# Patient Record
Sex: FEMALE | Race: WHITE | Employment: OTHER | URBAN - METROPOLITAN AREA
[De-identification: names, ages, dates, MRNs, and addresses within clinical notes are randomized per-mention and may not be internally consistent; named-entity substitution may affect disease eponyms.]

---

## 2017-11-09 PROBLEM — I87.2 CHRONIC VENOUS INSUFFICIENCY: Status: ACTIVE | Noted: 2017-11-09

## 2017-11-09 PROBLEM — Z87.891 HISTORY OF TOBACCO USE: Status: ACTIVE | Noted: 2017-11-09

## 2017-11-09 PROBLEM — L97.511 SKIN ULCER OF RIGHT GREAT TOE, LIMITED TO BREAKDOWN OF SKIN (HCC): Status: ACTIVE | Noted: 2017-11-09

## 2017-11-09 PROBLEM — I73.9 PVD (PERIPHERAL VASCULAR DISEASE) WITH CLAUDICATION (HCC): Status: ACTIVE | Noted: 2017-11-09

## 2017-11-09 PROBLEM — I87.2 CHRONIC VENOUS INSUFFICIENCY: Status: RESOLVED | Noted: 2017-11-09 | Resolved: 2017-11-09

## 2021-12-10 ENCOUNTER — APPOINTMENT (OUTPATIENT)
Dept: GENERAL RADIOLOGY | Age: 71
End: 2021-12-10
Payer: MEDICARE

## 2021-12-10 ENCOUNTER — APPOINTMENT (OUTPATIENT)
Dept: CT IMAGING | Age: 71
End: 2021-12-10
Payer: MEDICARE

## 2021-12-10 ENCOUNTER — HOSPITAL ENCOUNTER (EMERGENCY)
Age: 71
Discharge: HOME | End: 2021-12-10
Payer: MEDICARE

## 2021-12-10 VITALS
TEMPERATURE: 97.3 F | SYSTOLIC BLOOD PRESSURE: 143 MMHG | RESPIRATION RATE: 20 BRPM | HEART RATE: 84 BPM | OXYGEN SATURATION: 97 % | DIASTOLIC BLOOD PRESSURE: 72 MMHG

## 2021-12-10 DIAGNOSIS — M25.462 EFFUSION OF BOTH KNEE JOINTS: ICD-10-CM

## 2021-12-10 DIAGNOSIS — M25.461 EFFUSION OF BOTH KNEE JOINTS: ICD-10-CM
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
## 2021-12-10 DIAGNOSIS — S22.030A COMPRESSION FRACTURE OF T3 VERTEBRA, INITIAL ENCOUNTER (HCC): ICD-10-CM

## 2021-12-10 DIAGNOSIS — G93.89 MASS OF BRAIN: ICD-10-CM

## 2021-12-10 PROCEDURE — 72072 X-RAY EXAM THORAC SPINE 3VWS: CPT

## 2021-12-10 PROCEDURE — 72125 CT NECK SPINE W/O DYE: CPT

## 2021-12-10 PROCEDURE — 99283 EMERGENCY DEPT VISIT LOW MDM: CPT

## 2021-12-10 PROCEDURE — 73030 X-RAY EXAM OF SHOULDER: CPT

## 2021-12-10 PROCEDURE — 73560 X-RAY EXAM OF KNEE 1 OR 2: CPT

## 2021-12-10 PROCEDURE — 70450 CT HEAD/BRAIN W/O DYE: CPT

## 2021-12-10 PROCEDURE — 71046 X-RAY EXAM CHEST 2 VIEWS: CPT

## 2021-12-10 ASSESSMENT — ENCOUNTER SYMPTOMS
NAUSEA: 0
SINUS PRESSURE: 0
ABDOMINAL PAIN: 0
FACIAL SWELLING: 0
TROUBLE SWALLOWING: 0
VOMITING: 0
CHEST TIGHTNESS: 0
COUGH: 0
SINUS PAIN: 0
SORE THROAT: 0
COLOR CHANGE: 0
CONSTIPATION: 0
DIARRHEA: 0
SHORTNESS OF BREATH: 0

## 2021-12-11 ASSESSMENT — ENCOUNTER SYMPTOMS: BACK PAIN: 1
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
## 2022-10-19 ENCOUNTER — TELEPHONE (OUTPATIENT)
Dept: ORTHOPEDIC SURGERY | Age: 72
End: 2022-10-19

## 2022-10-19 NOTE — TELEPHONE ENCOUNTER
Spoke with patient about scheduling an appointment for her left thumb infection. She was offered an appointment for next week. Patient stated she would call the office back to schedule.

## 2022-10-21 ENCOUNTER — TELEPHONE (OUTPATIENT)
Dept: ORTHOPEDIC SURGERY | Age: 72
End: 2022-10-21

## 2022-10-21 DIAGNOSIS — M79.645 PAIN OF LEFT THUMB: Primary | ICD-10-CM

## 2022-10-24 ENCOUNTER — OFFICE VISIT (OUTPATIENT)
Dept: ORTHOPEDIC SURGERY | Age: 72
End: 2022-10-24
Payer: MEDICARE

## 2022-10-24 VITALS — HEIGHT: 67 IN | BODY MASS INDEX: 29.82 KG/M2 | WEIGHT: 190 LBS

## 2022-10-24 DIAGNOSIS — L08.9 INFECTION OF THUMB: Primary | ICD-10-CM

## 2022-10-24 PROCEDURE — 3017F COLORECTAL CA SCREEN DOC REV: CPT | Performed by: ORTHOPAEDIC SURGERY

## 2022-10-24 PROCEDURE — G8484 FLU IMMUNIZE NO ADMIN: HCPCS | Performed by: ORTHOPAEDIC SURGERY

## 2022-10-24 PROCEDURE — 99203 OFFICE O/P NEW LOW 30 MIN: CPT | Performed by: ORTHOPAEDIC SURGERY

## 2022-10-24 PROCEDURE — 1036F TOBACCO NON-USER: CPT | Performed by: ORTHOPAEDIC SURGERY

## 2022-10-24 PROCEDURE — G8417 CALC BMI ABV UP PARAM F/U: HCPCS | Performed by: ORTHOPAEDIC SURGERY

## 2022-10-24 PROCEDURE — G8427 DOCREV CUR MEDS BY ELIG CLIN: HCPCS | Performed by: ORTHOPAEDIC SURGERY

## 2022-10-24 PROCEDURE — 1123F ACP DISCUSS/DSCN MKR DOCD: CPT | Performed by: ORTHOPAEDIC SURGERY

## 2022-10-24 PROCEDURE — G8400 PT W/DXA NO RESULTS DOC: HCPCS | Performed by: ORTHOPAEDIC SURGERY

## 2022-10-24 PROCEDURE — 1090F PRES/ABSN URINE INCON ASSESS: CPT | Performed by: ORTHOPAEDIC SURGERY

## 2022-10-24 RX ORDER — DOXYCYCLINE HYCLATE 100 MG/1
100 CAPSULE ORAL 2 TIMES DAILY
COMMUNITY

## 2022-10-24 RX ORDER — CEPHALEXIN 500 MG/1
500 CAPSULE ORAL 2 TIMES DAILY
COMMUNITY

## 2022-10-24 NOTE — PROGRESS NOTES
Department of Orthopedic Surgery  History and Physical      CHIEF COMPLAINT: Left thumb infection    HISTORY OF PRESENT ILLNESS:                The patient is a 71 y.o. female who presents with left thumb infection. Patient states that about 2 weeks ago she cracked her nail on the left thumb and then a few days later she noticed she had some swelling proximal to this. The swelling increased and went into the base of the thumb just distal to the wrist.  So she went to urgent care where they diagnosed with paronychia and performed a incision and drainage. Started on half strength soaks and put her on antibiotics and advised her on wound care. States that it has been getting better up until earlier today. She states 2 days ago that the wound and skin had dried up significantly and appeared healed so she stopped soaks. Today though she complains that when she came out of the shower the tissue appeared more macerated. She denies any recent drainage. Denies any fever or chills. She is right-hand dominant. Denies any pain elsewhere. Patient diabetic with history of neuropathy. No other orthopedic plaints at this time.     Past Medical History:        Diagnosis Date    Anxiety     Chronic back pain     Depression     Fungal infection     GERD (gastroesophageal reflux disease)     Headache(784.0)     Hearing loss     History of tobacco use 11/9/2017    Hyperlipidemia     Hypertension     Hypothyroidism     Liver disease     Myalgia     Neck pain     Neuropathy     Numbness     Obesity     Palpitation     Pneumonia     Postnasal drip     PVD (peripheral vascular disease) with claudication ([address] 11/9/2017    Rash     Restless legs syndrome     Rib fracture     Skin ulcer of right great toe, limited to breakdown of skin ([address] 11/9/2017    Weakness     Wounds, multiple      Past Surgical History:        Procedure Laterality Date    APPENDECTOMY  01/01/1970    BREAST SURGERY  01/01/1987    CERVICAL LAMINECTOMY CHOLECYSTECTOMY  01/01/1970    COSMETIC SURGERY  01/01/1969    rhino    HYSTERECTOMY (CERVIX STATUS UNKNOWN)  01/01/1992    KNEE SURGERY  01/01/2007    PANCREAS SURGERY  01/01/1990    TUBAL LIGATION  01/01/1987     Current Medications:   No current facility-administered medications for this visit. Allergies:  Midazolam hcl and Heparin    Social History:   TOBACCO:   reports that she quit smoking about 5 years ago. Her smoking use included cigarettes. She has a 25.00 pack-year smoking history. She has never used smokeless tobacco.  ETOH:   reports current alcohol use. DRUGS:   reports no history of drug use.   ACTIVITIES OF DAILY LIVING:    OCCUPATION:    Family History:       Problem Relation Age of Onset    Arthritis Mother     Asthma Mother     Cancer Mother     Diabetes Mother     High Blood Pressure Mother     Heart Disease Mother     Kidney Disease Mother     Stroke Mother     High Cholesterol Mother     Arthritis Father     Asthma Father     Cancer Father     Diabetes Father     Heart Disease Father     High Blood Pressure Father     Kidney Disease Father     High Cholesterol Father     Arthritis Brother     High Blood Pressure Brother     High Cholesterol Brother     Arthritis Maternal Aunt     Asthma Maternal Aunt     Cancer Maternal Aunt     Heart Disease Maternal Aunt     High Blood Pressure Maternal Aunt     High Cholesterol Maternal Aunt     Diabetes Maternal Uncle     Heart Disease Maternal Uncle     High Blood Pressure Maternal Uncle     High Cholesterol Maternal Uncle     Arthritis Paternal Uncle     Cancer Paternal Uncle     Diabetes Paternal Uncle     Heart Disease Paternal Uncle     High Blood Pressure Paternal Uncle     High Cholesterol Paternal Uncle     Cancer Maternal Grandmother     Diabetes Maternal Grandfather     Heart Disease Maternal Grandfather     High Blood Pressure Maternal Grandfather     High Cholesterol Maternal Grandfather     Kidney Disease Maternal Grandfather     Diabetes Paternal Grandmother     Heart Disease Paternal Grandmother     High Blood Pressure Paternal Grandmother     High Cholesterol Paternal Grandmother     Stroke Paternal Grandmother     Diabetes Paternal Grandfather     Heart Disease Paternal Grandfather     High Blood Pressure Paternal Grandfather        REVIEW OF SYSTEMS:  CONSTITUTIONAL:  negative  EYES:  negative  RESPIRATORY:  negative  CARDIOVASCULAR:  negative  GASTROINTESTINAL:  negative  INTEGUMENT/BREAST:  negative  HEMATOLOGIC/LYMPHATIC:  negative  ALLERGIC/IMMUNOLOGIC:  negative  ENDOCRINE:  negative  MUSCULOSKELETAL:  positive for  pain  NEUROLOGICAL:  positive for numbness and tingling    PHYSICAL EXAM:    VITALS:  Ht 5' 7\" (1.702 m)   Wt 190 lb (86.2 kg)   BMI 29.76 kg/m²   CONSTITUTIONAL:  awake, alert, cooperative, no apparent distress, and appears stated age  EYES:  Lids and lashes normal, pupils equal, round and reactive to light, extra ocular muscles intact, sclera clear, conjunctiva normal  ENT:  Normocephalic, without obvious abnormality, atraumatic, sinuses nontender on palpation, external ears without lesions, oral pharynx with moist mucus membranes, tonsils without erythema or exudates, gums normal and good dentition. NECK:  Supple, symmetrical, trachea midline, no adenopathy, thyroid symmetric, not enlarged and no tenderness, skin normal  LUNGS:  CTA  CARDIOVASCULAR:  2+ radial pulses, extremities warm and well perfused  ABDOMEN:   NTTP  CHEST:  Atraumatic   GENITAL/URINARY:  deferred  NEUROLOGIC:  Awake, alert, oriented to name, place and time. Cranial nerves II-XII are grossly intact. Motor is 5 out of 5 bilaterally. Sensory is intact.  gait is normal.  MUSCULOSKELETAL:    Right upper extremity   Macerated tissue along the radial border of the thumb about the distal proximal phalanx. No active drainage noted, no abscess or fluctuance noted about the eponychial fold.   There is some erythema about the skin under the macerated tissue but this does not track proximally. No pain with movement of the IP or MCP joint  Comparments soft and compressible  +AIN/PIN/Ulnar/Median/Radial nerve function intact grossly  +2/4 Radial pulse, Cap refill <2sec  Distal sensation grossly intact to C4-T1 dermatomes       DATA:    CBC:   Lab Results   Component Value Date/Time    WBC 6.1 06/13/2017 01:02 PM    RBC 5.32 06/13/2017 01:02 PM    HGB 15.9 06/13/2017 01:02 PM    HCT 45.6 06/13/2017 01:02 PM    MCV 85.7 06/13/2017 01:02 PM    MCH 29.9 06/13/2017 01:02 PM    MCHC 34.9 06/13/2017 01:02 PM    RDW 12.8 06/13/2017 01:02 PM     06/13/2017 01:02 PM    MPV 9.9 06/13/2017 01:02 PM     PT/INR:  No results found for: PROTIME, INR    Radiology Review:  Xray: x-rays of the left hand were obtained today in the office and reviewed with the patient. 3 views: AP, oblique, lateral: demonstrate no acute fractures or dislocations  Impression: No acute fractures dislocations    IMPRESSION:  Paronychia, resolving  Peripheral vascular disease  GERD, hypertension  Neuropathy  Anxiety, depression    PLAN:  Discussed treatment diagnosis with patient. Macerated skin was removed in office today. Discussed local wound care. Patient to continue antibiotics, she has 10 days left. She should follow-up in 2 weeks. All questions and concerns answered     I have seen and evaluated the patient and agree with the above assessment and plan on today's visit. I have performed the key components of the history and physical examination with significant findings of improving thumb infection. . I concur with the findings and plan as documented.     Latrell Tsang MD  10/24/2022

## 2023-05-19 ENCOUNTER — TELEPHONE (OUTPATIENT)
Dept: BARIATRICS/WEIGHT MGMT | Age: 73
End: 2023-05-19

## 2023-06-14 LAB
ESTIMATED AVERAGE GLUCOSE FOR HBA1C: 189 MG/DL
HEMOGLOBIN A1C/HEMOGLOBIN TOTAL IN BLOOD: 8.2 %

## 2023-07-24 ENCOUNTER — TELEPHONE (OUTPATIENT)
Dept: BARIATRICS/WEIGHT MGMT | Age: 73
End: 2023-07-24

## 2023-10-24 NOTE — PROGRESS NOTES
Location of Pain:  Neck/Joints/Osteoarthritis   Pain medication prescribed by us: Norco 7.5 mg QID, Gabapentin 600 mg TID  Pain medication prescribed by other provider: no  Any adverse effects from medication: none  Average pain score with medication (0-10):    6-7  ave no 8-9   Percent effective: 70% ave, now 50  Do you take medicine exactly as prescribed? yes  Functional status (work, disability, retired, etc): retired,disability  Ability to manage activities of daily living: yes  Overall quality of family/social life with the current treatment (below average, average or above average):  below average  ER visit since last office visit: no  New medical issues since last office visit: no  Participating in (PT, Chiropractor, Tens Unit, Acupuncture, Aqua Therapy, Home Exercise): active    Last Controlled Substance Contract date: 08/08/2023  Last toxicology date: 08/08/2023   Consistent with prescribed meds: yes  OARRS reviewed: yes  Daily MME: 30.00  Yearly Narcan prescribed: 2023
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
## 2023-10-26 ENCOUNTER — OFFICE VISIT (OUTPATIENT)
Dept: PAIN MEDICINE | Facility: CLINIC | Age: 73
End: 2023-10-26
Payer: MEDICARE

## 2023-10-26 VITALS
HEIGHT: 67 IN | RESPIRATION RATE: 18 BRPM | SYSTOLIC BLOOD PRESSURE: 126 MMHG | HEART RATE: 73 BPM | DIASTOLIC BLOOD PRESSURE: 79 MMHG

## 2023-10-26 DIAGNOSIS — M96.1 POSTLAMINECTOMY SYNDROME, CERVICAL REGION: Primary | ICD-10-CM

## 2023-10-26 PROBLEM — M79.645 PAIN IN FINGER OF BOTH HANDS: Status: ACTIVE | Noted: 2023-10-26

## 2023-10-26 PROBLEM — M25.562 BILATERAL KNEE PAIN: Status: ACTIVE | Noted: 2023-10-26

## 2023-10-26 PROBLEM — M17.11 OSTEOARTHRITIS OF RIGHT KNEE: Status: ACTIVE | Noted: 2023-10-26

## 2023-10-26 PROBLEM — M48.50XA VERTEBRAL COMPRESSION FRACTURE (MULTI): Status: ACTIVE | Noted: 2023-10-26

## 2023-10-26 PROBLEM — M48.02 CERVICAL STENOSIS OF SPINE: Status: ACTIVE | Noted: 2023-10-26

## 2023-10-26 PROBLEM — M79.644 PAIN IN FINGER OF BOTH HANDS: Status: ACTIVE | Noted: 2023-10-26

## 2023-10-26 PROBLEM — M47.812 CERVICAL SPONDYLOSIS WITHOUT MYELOPATHY: Status: ACTIVE | Noted: 2023-10-26

## 2023-10-26 PROBLEM — I10 HYPERTENSION: Status: ACTIVE | Noted: 2023-10-26

## 2023-10-26 PROBLEM — L40.9 PSORIASIS: Status: ACTIVE | Noted: 2023-10-26

## 2023-10-26 PROBLEM — M81.0 OSTEOPOROSIS: Status: ACTIVE | Noted: 2023-10-26

## 2023-10-26 PROBLEM — M17.12 OSTEOARTHRITIS OF LEFT KNEE: Status: ACTIVE | Noted: 2023-10-26

## 2023-10-26 PROBLEM — M25.561 BILATERAL KNEE PAIN: Status: ACTIVE | Noted: 2023-10-26

## 2023-10-26 PROBLEM — F41.8 DEPRESSION WITH ANXIETY: Status: ACTIVE | Noted: 2023-10-26

## 2023-10-26 PROBLEM — M19.012 PRIMARY OSTEOARTHRITIS OF BOTH SHOULDERS: Status: ACTIVE | Noted: 2023-10-26

## 2023-10-26 PROBLEM — M19.011 PRIMARY OSTEOARTHRITIS OF BOTH SHOULDERS: Status: ACTIVE | Noted: 2023-10-26

## 2023-10-26 PROBLEM — M79.672 PAIN IN BOTH FEET: Status: ACTIVE | Noted: 2023-10-26

## 2023-10-26 PROBLEM — E11.9 DIABETES (MULTI): Status: ACTIVE | Noted: 2023-10-26

## 2023-10-26 PROBLEM — G62.89 SMALL FIBER POLYNEUROPATHY: Status: ACTIVE | Noted: 2023-10-26

## 2023-10-26 PROBLEM — D32.9 MENINGIOMA (MULTI): Status: ACTIVE | Noted: 2023-10-26

## 2023-10-26 PROBLEM — E03.9 HYPOTHYROID: Status: ACTIVE | Noted: 2023-10-26

## 2023-10-26 PROBLEM — M79.671 PAIN IN BOTH FEET: Status: ACTIVE | Noted: 2023-10-26

## 2023-10-26 PROBLEM — M19.90 OSTEOARTHRITIS: Status: ACTIVE | Noted: 2023-10-26

## 2023-10-26 PROBLEM — G89.29 CHRONIC PAIN: Status: ACTIVE | Noted: 2023-10-26

## 2023-10-26 PROBLEM — M54.2 NECK PAIN: Status: ACTIVE | Noted: 2023-10-26

## 2023-10-26 PROBLEM — Z98.890 H/O CERVICAL SPINE SURGERY: Status: ACTIVE | Noted: 2023-10-26

## 2023-10-26 PROCEDURE — 1159F MED LIST DOCD IN RCRD: CPT | Performed by: NURSE PRACTITIONER

## 2023-10-26 PROCEDURE — 99213 OFFICE O/P EST LOW 20 MIN: CPT | Performed by: NURSE PRACTITIONER

## 2023-10-26 PROCEDURE — 3074F SYST BP LT 130 MM HG: CPT | Performed by: NURSE PRACTITIONER

## 2023-10-26 PROCEDURE — 1160F RVW MEDS BY RX/DR IN RCRD: CPT | Performed by: NURSE PRACTITIONER

## 2023-10-26 PROCEDURE — 1036F TOBACCO NON-USER: CPT | Performed by: NURSE PRACTITIONER

## 2023-10-26 PROCEDURE — 3078F DIAST BP <80 MM HG: CPT | Performed by: NURSE PRACTITIONER

## 2023-10-26 PROCEDURE — 1125F AMNT PAIN NOTED PAIN PRSNT: CPT | Performed by: NURSE PRACTITIONER

## 2023-10-26 PROCEDURE — 3052F HG A1C>EQUAL 8.0%<EQUAL 9.0%: CPT | Performed by: NURSE PRACTITIONER

## 2023-10-26 RX ORDER — RISEDRONATE SODIUM 150 MG/1
150 TABLET, FILM COATED ORAL
COMMUNITY
Start: 2018-09-24

## 2023-10-26 RX ORDER — HYDROCODONE BITARTRATE AND ACETAMINOPHEN 7.5; 325 MG/1; MG/1
1 TABLET ORAL 4 TIMES DAILY PRN
Qty: 112 TABLET | Refills: 0 | Status: CANCELLED | OUTPATIENT
Start: 2023-11-23

## 2023-10-26 RX ORDER — ATENOLOL 25 MG/1
1 TABLET ORAL DAILY
COMMUNITY
Start: 2017-09-05

## 2023-10-26 RX ORDER — GABAPENTIN 300 MG/1
3 CAPSULE ORAL 3 TIMES DAILY
COMMUNITY
Start: 2018-10-17 | End: 2024-01-17 | Stop reason: WASHOUT

## 2023-10-26 RX ORDER — HYDROCHLOROTHIAZIDE 12.5 MG/1
CAPSULE ORAL
COMMUNITY
Start: 2022-05-12

## 2023-10-26 RX ORDER — ORAL SEMAGLUTIDE 7 MG/1
TABLET ORAL
COMMUNITY
Start: 2021-09-03

## 2023-10-26 RX ORDER — CITALOPRAM 10 MG/1
10 TABLET ORAL DAILY
COMMUNITY
Start: 2022-05-12

## 2023-10-26 RX ORDER — HYDROCODONE BITARTRATE AND ACETAMINOPHEN 7.5; 325 MG/1; MG/1
1 TABLET ORAL 4 TIMES DAILY PRN
Qty: 112 TABLET | Refills: 0 | Status: CANCELLED | OUTPATIENT
Start: 2023-12-21

## 2023-10-26 RX ORDER — AMLODIPINE BESYLATE 5 MG/1
TABLET ORAL
COMMUNITY
Start: 2022-08-02

## 2023-10-26 RX ORDER — LEVOTHYROXINE SODIUM 50 UG/1
1 TABLET ORAL DAILY
COMMUNITY
Start: 2018-05-29

## 2023-10-26 RX ORDER — HYDROCODONE BITARTRATE AND ACETAMINOPHEN 10; 325 MG/1; MG/1
1 TABLET ORAL EVERY 6 HOURS PRN
Qty: 112 TABLET | Refills: 0 | Status: SHIPPED | OUTPATIENT
Start: 2023-10-26 | End: 2023-10-27 | Stop reason: SDUPTHER

## 2023-10-26 RX ORDER — NALOXONE HYDROCHLORIDE 4 MG/.1ML
SPRAY NASAL
COMMUNITY
Start: 2019-11-05

## 2023-10-26 RX ORDER — HYDROCODONE BITARTRATE AND ACETAMINOPHEN 7.5; 325 MG/1; MG/1
1 TABLET ORAL 4 TIMES DAILY PRN
Qty: 112 TABLET | Refills: 0 | Status: CANCELLED | OUTPATIENT
Start: 2023-10-26

## 2023-10-26 RX ORDER — HYDROCODONE BITARTRATE AND ACETAMINOPHEN 10; 325 MG/1; MG/1
1 TABLET ORAL 4 TIMES DAILY PRN
COMMUNITY
Start: 2022-10-21 | End: 2023-12-21 | Stop reason: SDUPTHER

## 2023-10-26 RX ORDER — AMLODIPINE BESYLATE 2.5 MG/1
1 TABLET ORAL DAILY
COMMUNITY
Start: 2017-09-05

## 2023-10-26 RX ORDER — GLIPIZIDE 10 MG/1
1 TABLET, FILM COATED, EXTENDED RELEASE ORAL 2 TIMES DAILY
COMMUNITY
Start: 2017-09-05

## 2023-10-26 RX ORDER — METFORMIN HYDROCHLORIDE 500 MG/1
1 TABLET ORAL DAILY
COMMUNITY
Start: 2017-12-05

## 2023-10-26 RX ORDER — HYDROCODONE BITARTRATE AND ACETAMINOPHEN 7.5; 325 MG/1; MG/1
1 TABLET ORAL 4 TIMES DAILY PRN
COMMUNITY
Start: 2021-12-15 | End: 2024-01-17 | Stop reason: WASHOUT

## 2023-10-26 ASSESSMENT — ENCOUNTER SYMPTOMS
FACIAL ASYMMETRY: 0
ARTHRALGIAS: 1
SEIZURES: 0
PSYCHIATRIC NEGATIVE: 1
NUMBNESS: 0
ALLERGIC/IMMUNOLOGIC NEGATIVE: 1
BACK PAIN: 1
HEMATOLOGIC/LYMPHATIC NEGATIVE: 1
CARDIOVASCULAR NEGATIVE: 1
MYALGIAS: 1
HEADACHES: 0
DIZZINESS: 0
GASTROINTESTINAL NEGATIVE: 1
CONSTITUTIONAL NEGATIVE: 1
COLOR CHANGE: 1
SPEECH DIFFICULTY: 0
LIGHT-HEADEDNESS: 0
TREMORS: 0
ENDOCRINE NEGATIVE: 1
RESPIRATORY NEGATIVE: 1
WEAKNESS: 0

## 2023-10-26 ASSESSMENT — PAIN SCALES - GENERAL
PAINLEVEL: 9
PAINLEVEL_OUTOF10: 9

## 2023-10-26 ASSESSMENT — PATIENT HEALTH QUESTIONNAIRE - PHQ9
SUM OF ALL RESPONSES TO PHQ9 QUESTIONS 1 AND 2: 0
2. FEELING DOWN, DEPRESSED OR HOPELESS: NOT AT ALL
1. LITTLE INTEREST OR PLEASURE IN DOING THINGS: NOT AT ALL

## 2023-10-26 ASSESSMENT — PAIN - FUNCTIONAL ASSESSMENT: PAIN_FUNCTIONAL_ASSESSMENT: 0-10

## 2023-10-27 DIAGNOSIS — M96.1 POSTLAMINECTOMY SYNDROME, CERVICAL REGION: ICD-10-CM

## 2023-10-27 RX ORDER — HYDROCODONE BITARTRATE AND ACETAMINOPHEN 10; 325 MG/1; MG/1
1 TABLET ORAL EVERY 6 HOURS PRN
Qty: 112 TABLET | Refills: 0 | Status: SHIPPED | OUTPATIENT
Start: 2023-11-23 | End: 2024-01-17 | Stop reason: SDUPTHER

## 2023-10-30 DIAGNOSIS — M96.1 POSTLAMINECTOMY SYNDROME OF CERVICAL REGION: ICD-10-CM

## 2023-10-30 RX ORDER — HYDROCODONE BITARTRATE AND ACETAMINOPHEN 7.5; 325 MG/1; MG/1
1 TABLET ORAL 4 TIMES DAILY PRN
Qty: 112 TABLET | Refills: 0 | Status: SHIPPED | OUTPATIENT
Start: 2023-12-21

## 2023-11-07 ENCOUNTER — OFFICE VISIT (OUTPATIENT)
Dept: ORTHOPEDIC SURGERY | Facility: CLINIC | Age: 73
End: 2023-11-07
Payer: MEDICARE

## 2023-11-07 DIAGNOSIS — M25.562 CHRONIC PAIN OF BOTH KNEES: Primary | ICD-10-CM

## 2023-11-07 DIAGNOSIS — M25.561 CHRONIC PAIN OF BOTH KNEES: Primary | ICD-10-CM

## 2023-11-07 DIAGNOSIS — G89.29 CHRONIC PAIN OF BOTH KNEES: Primary | ICD-10-CM

## 2023-11-07 DIAGNOSIS — M17.0 PRIMARY OSTEOARTHRITIS OF BOTH KNEES: ICD-10-CM

## 2023-11-07 PROCEDURE — 1036F TOBACCO NON-USER: CPT | Performed by: ORTHOPAEDIC SURGERY

## 2023-11-07 PROCEDURE — 3052F HG A1C>EQUAL 8.0%<EQUAL 9.0%: CPT | Performed by: ORTHOPAEDIC SURGERY

## 2023-11-07 PROCEDURE — 1125F AMNT PAIN NOTED PAIN PRSNT: CPT | Performed by: ORTHOPAEDIC SURGERY

## 2023-11-07 PROCEDURE — 20610 DRAIN/INJ JOINT/BURSA W/O US: CPT | Performed by: ORTHOPAEDIC SURGERY

## 2023-11-07 PROCEDURE — 99214 OFFICE O/P EST MOD 30 MIN: CPT | Performed by: ORTHOPAEDIC SURGERY

## 2023-11-07 PROCEDURE — 3078F DIAST BP <80 MM HG: CPT | Performed by: ORTHOPAEDIC SURGERY

## 2023-11-07 PROCEDURE — 1159F MED LIST DOCD IN RCRD: CPT | Performed by: ORTHOPAEDIC SURGERY

## 2023-11-07 PROCEDURE — 1160F RVW MEDS BY RX/DR IN RCRD: CPT | Performed by: ORTHOPAEDIC SURGERY

## 2023-11-07 PROCEDURE — 3074F SYST BP LT 130 MM HG: CPT | Performed by: ORTHOPAEDIC SURGERY

## 2023-11-07 NOTE — PROGRESS NOTES
This is a consultation from Dr. Adria Cortez MD for   Chief Complaint   Patient presents with    Right Knee - Pain    Left Knee - Pain       This is a 73 y.o. female who presents for follow-up for bilateral knee pain.  Patient has bilateral knee arthritis, she had cortisone injections about 3 months ago.  She said they worked very well and she had excellent relief of her pain for several months.  In the last 1 to 2 weeks has had return of her symptoms exacerbation of her pain.  Complains of sharp type pain over the medial knee worse with walking proving with rest.  No numbness or tingling no other issues.  She is actually getting around quite well and doing most of her activities.    Physical Exam    There has been no interval change in this patient's past medical, surgical, medications, allergies, family history or social history since the most recent visit to a provider within our department. 14 point review of systems was performed, reviewed, and negative except for pertinent positives documented in the history of present illness.     Constitutional: well developed, well nourished female in no acute distress  Psychiatric: normal mood, appropriate affect  Eyes: sclera anicteric  HENT: normocephalic/atraumatic  CV: regular rate and rhythm   Respiratory: non labored breathing  Integumentary: no rash  Neurological: moves all extremities    Bilateral knee exam: skin intact no lacerations or abrations.  1+ effusion.  Tender medial joint line. negative log roll negative patellar grind. ROM 0-120. stable to varus and valgus stress at 0 and 30 degrees. negative lachman negative posterior drawer negative phoebe. 5/5 ehl/fhl/gs/ta. silt s/s/sp/dp/t. 2+ dp/pt        Procedure Note:    Diagnosis: bilateral knee arthritis  Procedure: bilateral knee injection    Verbal consent was obtained from the patient, risks benefits and alternatives were discussed. We discussed the risks and benefits and potential morbidity related  to the treatment, and to the prescription medication administered in the injectionA timeout was performed, and the correct patient and site of injection were identified and verified. The lateral side of the knee was sterilized with Betadine, and anesthetized with ethyl chloride spray. The bilateral knee was injected with 1ml kenalog and 4ml lidocaine in the usual fashion. A sterile Band-Aid was placed. The patient tolerated the procedure well with no immediate complications. Standard post injection precautions and instructions were given.        Procedures      Impression/Plan: This is a 73 y.o. female with bilateral knee arthritis.  I had an in depth discussion with the patient regarding treatment options for arthritis and their relative risks and benefits. We reviewed surgical and nonsurgical option for treatment. Treatments include anti inflammatory medications, physical therapy, weight loss, activity modification, use of assistive devices, injection therapies. We discussed current prescriptions and risks and benefits of continuation of prescription medication as apporpriate. We discussed that arthritis is often progressive over time, an in end stage arthritis surgical interventions can be considered, including arthroplasty. All questions were answered and the patient voiced their understanding.  Doing well with nonsurgical treatment, she is also working on her A1c.  I will see her back as needed    BMI Readings from Last 1 Encounters:   No data found for BMI      Lab Results   Component Value Date    CREATININE 0.70 01/07/2022     Tobacco Use: Medium Risk (10/26/2023)    Patient History     Smoking Tobacco Use: Former     Smokeless Tobacco Use: Never     Passive Exposure: Not on file      Computed MELD 3.0 unavailable. Necessary lab results were not found in the last year.  Computed MELD-Na unavailable. Necessary lab results were not found in the last year.       Lab Results   Component Value Date    HGBA1C 8.2  "(A) 06/13/2023     No results found for: \"STAPHMRSASCR\"  "

## 2023-11-17 ENCOUNTER — HOSPITAL ENCOUNTER (OUTPATIENT)
Dept: RADIOLOGY | Facility: HOSPITAL | Age: 73
Discharge: HOME | End: 2023-11-17
Payer: MEDICARE

## 2023-11-17 DIAGNOSIS — D32.9 BENIGN NEOPLASM OF MENINGES, UNSPECIFIED (MULTI): ICD-10-CM

## 2023-11-17 PROCEDURE — 70553 MRI BRAIN STEM W/O & W/DYE: CPT | Performed by: RADIOLOGY

## 2023-11-17 PROCEDURE — 70553 MRI BRAIN STEM W/O & W/DYE: CPT

## 2023-11-17 PROCEDURE — 2550000001 HC RX 255 CONTRASTS: Performed by: NEUROLOGICAL SURGERY

## 2023-11-17 PROCEDURE — A9575 INJ GADOTERATE MEGLUMI 0.1ML: HCPCS | Performed by: NEUROLOGICAL SURGERY

## 2023-11-17 RX ORDER — GADOTERATE MEGLUMINE 376.9 MG/ML
0.2 INJECTION INTRAVENOUS
Status: COMPLETED | OUTPATIENT
Start: 2023-11-17 | End: 2023-11-17

## 2023-11-17 RX ADMIN — GADOTERATE MEGLUMINE 20 ML: 376.9 INJECTION INTRAVENOUS at 10:00

## 2023-11-20 NOTE — RESULT ENCOUNTER NOTE
Spoke with the patient and update her the result of the MRI.  Recommend MRI with and without Phu in 1 year.

## 2023-11-21 DIAGNOSIS — D32.0 CEREBRAL MENINGIOMA (MULTI): Primary | ICD-10-CM

## 2023-12-12 RX ORDER — TRIAMCINOLONE ACETONIDE 40 MG/ML
40 INJECTION, SUSPENSION INTRA-ARTICULAR; INTRAMUSCULAR ONCE
Status: COMPLETED | OUTPATIENT
Start: 2023-12-12 | End: 2023-12-12

## 2023-12-12 RX ADMIN — TRIAMCINOLONE ACETONIDE 40 MG: 40 INJECTION, SUSPENSION INTRA-ARTICULAR; INTRAMUSCULAR at 12:45

## 2023-12-21 DIAGNOSIS — Z98.890 H/O CERVICAL SPINE SURGERY: ICD-10-CM

## 2023-12-21 DIAGNOSIS — M47.812 CERVICAL SPONDYLOSIS WITHOUT MYELOPATHY: ICD-10-CM

## 2023-12-21 DIAGNOSIS — M48.02 CERVICAL STENOSIS OF SPINE: ICD-10-CM

## 2023-12-21 RX ORDER — HYDROCODONE BITARTRATE AND ACETAMINOPHEN 10; 325 MG/1; MG/1
1 TABLET ORAL 4 TIMES DAILY PRN
Qty: 112 TABLET | Refills: 0 | Status: SHIPPED | OUTPATIENT
Start: 2023-12-21 | End: 2024-01-17 | Stop reason: SDUPTHER

## 2023-12-21 NOTE — TELEPHONE ENCOUNTER
Elizabeth: Pt called because her dose was incorrect and her pharmacy is out of stock.    Corrected dose and will set to send to [facility].

## 2024-01-17 ENCOUNTER — OFFICE VISIT (OUTPATIENT)
Dept: PAIN MEDICINE | Facility: CLINIC | Age: 74
End: 2024-01-17
Payer: MEDICARE

## 2024-01-17 VITALS — DIASTOLIC BLOOD PRESSURE: 83 MMHG | HEART RATE: 75 BPM | RESPIRATION RATE: 18 BRPM | SYSTOLIC BLOOD PRESSURE: 124 MMHG

## 2024-01-17 DIAGNOSIS — M48.02 CERVICAL STENOSIS OF SPINE: ICD-10-CM

## 2024-01-17 DIAGNOSIS — M96.1 POSTLAMINECTOMY SYNDROME, CERVICAL REGION: ICD-10-CM

## 2024-01-17 DIAGNOSIS — Z98.890 H/O CERVICAL SPINE SURGERY: ICD-10-CM

## 2024-01-17 DIAGNOSIS — M17.11 PRIMARY OSTEOARTHRITIS OF RIGHT KNEE: ICD-10-CM

## 2024-01-17 DIAGNOSIS — M47.812 CERVICAL SPONDYLOSIS WITHOUT MYELOPATHY: ICD-10-CM

## 2024-01-17 PROCEDURE — 1160F RVW MEDS BY RX/DR IN RCRD: CPT | Performed by: NURSE PRACTITIONER

## 2024-01-17 PROCEDURE — 3074F SYST BP LT 130 MM HG: CPT | Performed by: NURSE PRACTITIONER

## 2024-01-17 PROCEDURE — 1125F AMNT PAIN NOTED PAIN PRSNT: CPT | Performed by: NURSE PRACTITIONER

## 2024-01-17 PROCEDURE — 99213 OFFICE O/P EST LOW 20 MIN: CPT | Performed by: NURSE PRACTITIONER

## 2024-01-17 PROCEDURE — 1036F TOBACCO NON-USER: CPT | Performed by: NURSE PRACTITIONER

## 2024-01-17 PROCEDURE — 1159F MED LIST DOCD IN RCRD: CPT | Performed by: NURSE PRACTITIONER

## 2024-01-17 PROCEDURE — 3079F DIAST BP 80-89 MM HG: CPT | Performed by: NURSE PRACTITIONER

## 2024-01-17 RX ORDER — HYDROCODONE BITARTRATE AND ACETAMINOPHEN 10; 325 MG/1; MG/1
1 TABLET ORAL 4 TIMES DAILY PRN
Qty: 112 TABLET | Refills: 0 | Status: SHIPPED | OUTPATIENT
Start: 2024-01-18 | End: 2024-01-18 | Stop reason: SDUPTHER

## 2024-01-17 RX ORDER — CHOLECALCIFEROL (VITAMIN D3) 50 MCG
2000 TABLET ORAL DAILY
COMMUNITY

## 2024-01-17 RX ORDER — GABAPENTIN 600 MG/1
600 TABLET ORAL 3 TIMES DAILY
Qty: 300 TABLET | Refills: 1 | Status: SHIPPED | OUTPATIENT
Start: 2024-02-10 | End: 2024-04-08 | Stop reason: SDUPTHER

## 2024-01-17 RX ORDER — PREDNISONE 20 MG/1
20 TABLET ORAL 2 TIMES DAILY
Qty: 10 TABLET | Refills: 0 | Status: SHIPPED | OUTPATIENT
Start: 2024-01-17 | End: 2024-01-22

## 2024-01-17 RX ORDER — HYDROCODONE BITARTRATE AND ACETAMINOPHEN 10; 325 MG/1; MG/1
1 TABLET ORAL 4 TIMES DAILY PRN
Qty: 112 TABLET | Refills: 0 | Status: SHIPPED | OUTPATIENT
Start: 2024-02-15 | End: 2024-04-08 | Stop reason: SDUPTHER

## 2024-01-17 RX ORDER — HYDROCODONE BITARTRATE AND ACETAMINOPHEN 10; 325 MG/1; MG/1
1 TABLET ORAL 4 TIMES DAILY PRN
Qty: 112 TABLET | Refills: 0 | Status: SHIPPED | OUTPATIENT
Start: 2024-03-14 | End: 2024-04-08 | Stop reason: SDUPTHER

## 2024-01-17 RX ORDER — GABAPENTIN 600 MG/1
600 TABLET ORAL 3 TIMES DAILY
COMMUNITY
Start: 2023-11-01 | End: 2024-01-17 | Stop reason: SDUPTHER

## 2024-01-17 ASSESSMENT — ENCOUNTER SYMPTOMS
MYALGIAS: 1
SPEECH DIFFICULTY: 0
ARTHRALGIAS: 1
ALLERGIC/IMMUNOLOGIC NEGATIVE: 1
LIGHT-HEADEDNESS: 0
NECK PAIN: 1
NECK STIFFNESS: 0
PSYCHIATRIC NEGATIVE: 1
CARDIOVASCULAR NEGATIVE: 1
CONSTITUTIONAL NEGATIVE: 1
GASTROINTESTINAL NEGATIVE: 1
NUMBNESS: 0
SEIZURES: 0
JOINT SWELLING: 0
HEADACHES: 1
RESPIRATORY NEGATIVE: 1
ENDOCRINE NEGATIVE: 1
EYES NEGATIVE: 1
TREMORS: 0
DIZZINESS: 0
FACIAL ASYMMETRY: 0
HEMATOLOGIC/LYMPHATIC NEGATIVE: 1
WEAKNESS: 0
BACK PAIN: 1

## 2024-01-17 ASSESSMENT — PAIN SCALES - GENERAL: PAINLEVEL: 9
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
## 2024-01-18 ENCOUNTER — TELEPHONE (OUTPATIENT)
Dept: PAIN MEDICINE | Facility: CLINIC | Age: 74
End: 2024-01-18
Payer: MEDICARE

## 2024-01-18 DIAGNOSIS — M47.812 CERVICAL SPONDYLOSIS WITHOUT MYELOPATHY: ICD-10-CM

## 2024-01-18 DIAGNOSIS — M48.02 CERVICAL STENOSIS OF SPINE: ICD-10-CM

## 2024-01-18 DIAGNOSIS — Z98.890 H/O CERVICAL SPINE SURGERY: ICD-10-CM

## 2024-01-18 NOTE — TELEPHONE ENCOUNTER
Patient needs hydrocodone sent to [facility] in [place] because her pharmacy does not have in stock.

## 2024-01-19 RX ORDER — HYDROCODONE BITARTRATE AND ACETAMINOPHEN 10; 325 MG/1; MG/1
1 TABLET ORAL 4 TIMES DAILY PRN
Qty: 112 TABLET | Refills: 0 | Status: SHIPPED | OUTPATIENT
Start: 2024-01-19 | End: 2024-04-08 | Stop reason: SDUPTHER

## 2024-01-26 ENCOUNTER — TELEPHONE (OUTPATIENT)
Dept: BARIATRICS/WEIGHT MGMT | Age: 74
End: 2024-01-26

## 2024-01-26 NOTE — TELEPHONE ENCOUNTER
I called patient,patient refused to make appt at this time, phone number given if patient decides to schedule medical weight loss appt.

## 2024-02-06 ENCOUNTER — OFFICE VISIT (OUTPATIENT)
Dept: ORTHOPEDIC SURGERY | Facility: CLINIC | Age: 74
End: 2024-02-06
Payer: MEDICARE

## 2024-02-06 DIAGNOSIS — M17.0 PRIMARY OSTEOARTHRITIS OF BOTH KNEES: Primary | ICD-10-CM

## 2024-02-06 PROCEDURE — 20610 DRAIN/INJ JOINT/BURSA W/O US: CPT | Performed by: ORTHOPAEDIC SURGERY

## 2024-02-06 PROCEDURE — 1159F MED LIST DOCD IN RCRD: CPT | Performed by: ORTHOPAEDIC SURGERY

## 2024-02-06 PROCEDURE — 1036F TOBACCO NON-USER: CPT | Performed by: ORTHOPAEDIC SURGERY

## 2024-02-06 PROCEDURE — 1125F AMNT PAIN NOTED PAIN PRSNT: CPT | Performed by: ORTHOPAEDIC SURGERY

## 2024-02-06 PROCEDURE — 99214 OFFICE O/P EST MOD 30 MIN: CPT | Performed by: ORTHOPAEDIC SURGERY

## 2024-02-06 PROCEDURE — 1160F RVW MEDS BY RX/DR IN RCRD: CPT | Performed by: ORTHOPAEDIC SURGERY

## 2024-02-06 RX ORDER — TRIAMCINOLONE ACETONIDE 40 MG/ML
1 INJECTION, SUSPENSION INTRA-ARTICULAR; INTRAMUSCULAR
Status: COMPLETED | OUTPATIENT
Start: 2024-02-06 | End: 2024-02-06

## 2024-02-06 RX ADMIN — TRIAMCINOLONE ACETONIDE 1 ML: 40 INJECTION, SUSPENSION INTRA-ARTICULAR; INTRAMUSCULAR at 09:08

## 2024-02-06 NOTE — PROGRESS NOTES
This is a consultation from Dr. Adria Cortez MD for   Chief Complaint   Patient presents with   • Left Knee - Pain   • Right Knee - Pain       This is a 73 y.o. female who presents for follow-up for her bilateral knees.  Patient has bilateral knee arthritis, she had cortisone injections about 3 months ago.  They gave her good relief initially but she states she had COVID-19 in November and the increased inflammation made her knee pain worse.  She has had exacerbation of her pain since have seen her last, sharp stabbing pain over the medial knee on both sides.  She has difficult time walking.  She is been taking over-the-counter anti-inflammatories no other new issues or symptoms    Physical Exam    There has been no interval change in this patient's past medical, surgical, medications, allergies, family history or social history since the most recent visit to a provider within our department. 14 point review of systems was performed, reviewed, and negative except for pertinent positives documented in the history of present illness.     Constitutional: well developed, well nourished female in no acute distress  Psychiatric: normal mood, appropriate affect  Eyes: sclera anicteric  HENT: normocephalic/atraumatic  CV: regular rate and rhythm   Respiratory: non labored breathing  Integumentary: no rash  Neurological: moves all extremities    Bilateral knee exam: skin intact no lacerations or abrations.  1+ effusion.  Tender medial joint line. negative log roll negative patellar grind. ROM 0-120. stable to varus and valgus stress at 0 and 30 degrees. negative lachman negative posterior drawer negative phoebe. 5/5 ehl/fhl/gs/ta. silt s/s/sp/dp/t. 2+ dp/pt        Xrays were ordered by me, they were reviewed and independently interpreted by me today, they show severe degenerative disease bone-on-bone arthritis    L Inj/Asp: bilateral knee on 2/6/2024 9:08 AM  Indications: pain and joint swelling  Details: 22 G needle,  anterolateral approach  Medications (Right): 1 mL triamcinolone acetonide 40 mg/mL  Medications (Left): 1 mL triamcinolone acetonide 40 mg/mL    Discussion:  I discussed the conservative treatment options for knee osteoarthritis including but not limited to physical therapy, oral NSAIDS, activity and lifestyle modification, and corticosteroid injections. Pt has elected to undergo a cortisone injection today. I have explained the risk and benefits of an injection including the possibility of joint infection, bleeding, damage to cartilage, allergic reaction. Patient verbalized understanding and gave verbal consent wishes to proceed with a intra-articular cortisone injection for their knee.    Procedure:  After discussing the risk and benefits of the procedure, we proceeded with an intra-articular bilateral knee injection. We discussed the risks and benefits and potential morbidity related to the treatment, and to the prescription medication administered in the injection    With the patient's informed verbal consent, the bilateral knees were prepped in standard sterile fashion with Chlorhexidine. The skin was then anesthetized with ethyl chloride spray and cleaned again with Chlorhexidine. The bilateral knees were then apirated/injected with a prefilled 20-gauge syringe of 40 mg Kenalog + 4 ml Lidocaine using the lateral approach without complications.  The patient tolerated this well and felt immediate initial relief of symptoms. A bandaid was applied and the patient ambulated out of the clinic on ther own accord without difficulty. Patient was instructed to avoid physical activity for 24-48 hours to prevent the knees from swelling and may ice the knees as tolerated. Patient should contact the office if any signs of of infection appear: redness, fever, chills, drainage, swelling or warmth to the knees.  Pt understands that the injections can be repeated no sooner than 3 months.      Procedure, treatment alternatives,  "risks and benefits explained, specific risks discussed. Consent was given by the patient. Immediately prior to procedure a time out was called to verify the correct patient, procedure, equipment, support staff and site/side marked as required. Patient was prepped and draped in the usual sterile fashion.         Impression/Plan: This is a 73 y.o. female with bilateral knee arthritis.  I had an in depth discussion with the patient regarding treatment options for arthritis and their relative risks and benefits. We reviewed surgical and nonsurgical option for treatment. Treatments include anti inflammatory medications, physical therapy, weight loss, activity modification, use of assistive devices, injection therapies. We discussed current prescriptions and risks and benefits of continuation of prescription medication as apporpriate. We discussed that arthritis is often progressive over time, an in end stage arthritis surgical interventions can be considered, including arthroplasty. All questions were answered and the patient voiced their understanding.  Continue nonsurgical treatment, her A1c is elevated beyond the threshold for possible OR at this point.  I will see her back as needed    BMI Readings from Last 1 Encounters:   11/17/23 36.02 kg/m²      Lab Results   Component Value Date    CREATININE 0.70 01/07/2022     Tobacco Use: Medium Risk (2/6/2024)    Patient History    • Smoking Tobacco Use: Former    • Smokeless Tobacco Use: Never    • Passive Exposure: Not on file      Computed MELD 3.0 unavailable. Necessary lab results were not found in the last year.  Computed MELD-Na unavailable. Necessary lab results were not found in the last year.       Lab Results   Component Value Date    HGBA1C 8.2 (A) 06/13/2023     No results found for: \"STAPHMRSASCR\"  "

## 2024-02-19 ENCOUNTER — OFFICE VISIT (OUTPATIENT)
Dept: ORTHOPEDIC SURGERY | Facility: CLINIC | Age: 74
End: 2024-02-19
Payer: MEDICARE

## 2024-02-19 ENCOUNTER — PREP FOR PROCEDURE (OUTPATIENT)
Dept: ORTHOPEDIC SURGERY | Facility: CLINIC | Age: 74
End: 2024-02-19

## 2024-02-19 ENCOUNTER — HOSPITAL ENCOUNTER (OUTPATIENT)
Dept: RADIOLOGY | Facility: HOSPITAL | Age: 74
Discharge: HOME | End: 2024-02-19
Payer: MEDICARE

## 2024-02-19 DIAGNOSIS — G56.21 ULNAR NEUROPATHY AT ELBOW OF RIGHT UPPER EXTREMITY: Primary | ICD-10-CM

## 2024-02-19 DIAGNOSIS — M79.641 HAND PAIN, RIGHT: ICD-10-CM

## 2024-02-19 PROCEDURE — 73130 X-RAY EXAM OF HAND: CPT | Mod: RT

## 2024-02-19 PROCEDURE — 1036F TOBACCO NON-USER: CPT | Performed by: ORTHOPAEDIC SURGERY

## 2024-02-19 PROCEDURE — 99204 OFFICE O/P NEW MOD 45 MIN: CPT | Performed by: ORTHOPAEDIC SURGERY

## 2024-02-19 PROCEDURE — 73130 X-RAY EXAM OF HAND: CPT | Mod: RIGHT SIDE | Performed by: RADIOLOGY

## 2024-02-19 PROCEDURE — 1160F RVW MEDS BY RX/DR IN RCRD: CPT | Performed by: ORTHOPAEDIC SURGERY

## 2024-02-19 PROCEDURE — 1125F AMNT PAIN NOTED PAIN PRSNT: CPT | Performed by: ORTHOPAEDIC SURGERY

## 2024-02-19 PROCEDURE — 1159F MED LIST DOCD IN RCRD: CPT | Performed by: ORTHOPAEDIC SURGERY

## 2024-02-19 ASSESSMENT — PAIN DESCRIPTION - DESCRIPTORS: DESCRIPTORS: ACHING;SHARP;STABBING

## 2024-02-19 ASSESSMENT — PAIN - FUNCTIONAL ASSESSMENT: PAIN_FUNCTIONAL_ASSESSMENT: 0-10

## 2024-02-19 ASSESSMENT — PAIN SCALES - GENERAL: PAINLEVEL_OUTOF10: 9

## 2024-02-19 NOTE — PROGRESS NOTES
History of Present Illness:  Chief Complaint   Patient presents with    Right Hand - Pain       Patient presents today for evaluation of right hand and arm pain that radiates from her hand towards her elbow.  This has been worsening over the last few months.  She does have some numbness and tingling into her right hand, but believes this is secondary to underlying neuropathy as well as her history of Raynaud's.  She has not tried any specific treatments.  Patient does also have history of cervical radiculopathy.  An EMG of the right upper extremity was performed in May 2018 that did not demonstrate any specific abnormalities.    Past Medical History:   Diagnosis Date    Carpal tunnel syndrome, bilateral upper limbs 04/11/2018    Bilateral carpal tunnel syndrome    Personal history of other diseases of the nervous system and sense organs 12/03/2018    History of peripheral neuropathy    Radiculopathy, cervical region 04/11/2018    Cervical radiculopathy at C5    Radiculopathy, cervical region 04/11/2018    Cervical radiculopathy at C6    Radiculopathy, cervical region 04/11/2018    Cervical radiculopathy at C7       Medication Documentation Review Audit       Reviewed by Racheal Irene CMA (Medical Assistant) on 02/19/24 at 0855      Medication Order Taking? Sig Documenting Provider Last Dose Status   amLODIPine (Norvasc) 2.5 mg tablet [id number]  Take 1 tablet (2.5 mg) by mouth once daily. Historical Provider, MD  Active   amLODIPine (Norvasc) 5 mg tablet [id number]  Take by mouth. Historical Provider, MD  Active   atenolol (Tenormin) 25 mg tablet [id number]  Take 1 tablet (25 mg) by mouth once daily. Historical Provider, MD  Active   cholecalciferol (Vitamin D-3) 50 MCG (2000 UT) tablet [id number]  Take 1 tablet (2,000 Units) by mouth once daily. Historical Provider, MD  Active   citalopram (CeleXA) 10 mg tablet [id number]  Take 1 tablet (10 mg) by mouth once daily. Historical Provider, MD  Active   gabapentin (Neurontin)  600 mg tablet [id number]  Take 1 tablet (600 mg) by mouth 3 times a day. Do not start before February 10, 2024. SCOTT Agrawal  Active   glipiZIDE XL (Glucotrol XL) 10 mg 24 hr tablet [id number]  Take 1 tablet (10 mg) by mouth 2 times a day. Historical Provider, MD  Active   hydroCHLOROthiazide (Microzide) 12.5 mg capsule [id number]  Take by mouth. Historical Provider, MD  Active   HYDROcodone-acetaminophen (Norco)  mg tablet [id number]  Take 1 tablet by mouth 4 times a day as needed for severe pain (7 - 10) for up to 28 days. Do not start before February 15, 2024. SCOTT Agrawal  Active   HYDROcodone-acetaminophen (Norco)  mg tablet [id number]  Take 1 tablet by mouth 4 times a day as needed for severe pain (7 - 10) for up to 28 days. Do not start before 2024. SCOTT Agrawal  Active   HYDROcodone-acetaminophen (Norco)  mg tablet [id number]  Take 1 tablet by mouth 4 times a day as needed for severe pain (7 - 10) for up to 28 days. Luís Carrero MD   24 2359   HYDROcodone-acetaminophen (Norco) 7.5-325 mg tablet [id number] No Take 1 tablet by mouth 4 times a day as needed for severe pain (7 - 10). Do not start before 2023.   Patient not taking: Reported on 2024    SCOTT Agrawal Not Taking Active   levothyroxine (Synthroid, Levoxyl) 50 mcg tablet [id number]  Take 1 tablet (50 mcg) by mouth once daily. Historical Provider, MD  Active   metFORMIN (Glucophage) 500 mg tablet [id number]  Take 1 tablet (500 mg) by mouth once daily. Historical Provider, MD  Active   naloxone (Narcan) 4 mg/0.1 mL nasal spray [id number]  Administer into affected nostril(s). Historical Provider, MD  Active   risedronate (Actonel) 150 mg tablet [id number]  Take 1 tablet (150 mg) by mouth every 30 (thirty) days.  take with 8 oz of water, first thing in AM, do not take any other meds or food for 1/2 hr. Stay upright Historical Provider, MD  Active    semaglutide (Rybelsus) 7 mg tablet [id number]  Take by mouth. Historical Provider, MD  Active                    Allergies   Allergen Reactions    Heparin Unknown    Versed [Midazolam] Confusion       Social History     Socioeconomic History    Marital status:      Spouse name: Not on file    Number of children: Not on file    Years of education: Not on file    Highest education level: Not on file   Occupational History    Not on file   Tobacco Use    Smoking status: Former     Years: 25     Types: Cigarettes     Quit date:      Years since quittin.1    Smokeless tobacco: Never   Substance and Sexual Activity    Alcohol use: Not on file    Drug use: Not on file    Sexual activity: Not on file   Other Topics Concern    Not on file   Social History Narrative    Not on file     Social Determinants of Health     Financial Resource Strain: Not on file   Food Insecurity: Not on file   Transportation Needs: Not on file   Physical Activity: Not on file   Stress: Not on file   Social Connections: Not on file   Intimate Partner Violence: Not on file   Housing Stability: Not on file       Past Surgical History:   Procedure Laterality Date    BREAST SURGERY  2018    Breast Surgery Reduction Procedure    HYSTERECTOMY  2018    Hysterectomy    KNEE SURGERY  2018    Knee Surgery    NECK SURGERY  2018    Neck Surgery    STOMACH SURGERY  2018    Gastric Surgery        Review of Systems   GENERAL: Negative for malaise, significant weight loss, fever  MUSCULOSKELETAL: see HPI  NEURO:  Negative     Physical Examination  Constitutional: Appears well-developed and well-nourished.  Head: Normocephalic and atraumatic.  Eyes: EOMI grossly  Cardiovascular: Intact distal pulses.   Respiratory: Effort normal. No respiratory distress.  Neurologic: Alert and oriented to person, place, and time.  Skin: Skin is warm and dry.  Hematologic / Lymphatic: No lymphedema, lymphangitis.  Psychiatric: normal  mood and affect. Behavior is normal.   Musculoskeletal:  Right upper extremity: Skin intact.  Normal rugal patterns.  No restrictions with elbow/wrist range of motion.  0 cm DPC.  No tenderness about A1 pulleys.  Sensation grossly intact to light touch with slight paresthesias at tips of digits.  Capillary refill less than 2 seconds.  2+ radial pulse.  Positive Tinel's at level of carpal tunnel.  No change with Raffaele/Phalen's.  5/5 thumb abduction/finger abduction.    Radiographs: Right hand radiographs ordered and available for my review/interpretation: Narrowing of STT interval.  No fracture/dislocation.     Assessment:  Patient with right hand symptoms most consistent with carpal tunnel syndrome with underlying neuropathy.     Plan:  We discussed likely diagnosis as well as potential for double crush syndrome.  Given her other overlapping diagnoses I have recommended and ordered a repeat right upper extremity EMG for further assessment.  Given these overlapping diagnoses we did discuss high potential for some degree of continued symptoms regardless of treatment.  She will begin with overnight wrist bracing and plan for follow-up after nerve testing is completed for further review and management planning.

## 2024-03-05 ENCOUNTER — HOSPITAL ENCOUNTER (OUTPATIENT)
Dept: NEUROLOGY | Facility: CLINIC | Age: 74
Discharge: HOME | End: 2024-03-05
Payer: MEDICARE

## 2024-03-05 DIAGNOSIS — G56.21 ULNAR NEUROPATHY AT ELBOW OF RIGHT UPPER EXTREMITY: ICD-10-CM

## 2024-03-05 PROCEDURE — 95886 MUSC TEST DONE W/N TEST COMP: CPT | Mod: GC | Performed by: STUDENT IN AN ORGANIZED HEALTH CARE EDUCATION/TRAINING PROGRAM

## 2024-03-05 PROCEDURE — 95910 NRV CNDJ TEST 7-8 STUDIES: CPT | Mod: GC | Performed by: STUDENT IN AN ORGANIZED HEALTH CARE EDUCATION/TRAINING PROGRAM

## 2024-03-05 PROCEDURE — 95910 NRV CNDJ TEST 7-8 STUDIES: CPT | Performed by: STUDENT IN AN ORGANIZED HEALTH CARE EDUCATION/TRAINING PROGRAM

## 2024-03-05 PROCEDURE — 95886 MUSC TEST DONE W/N TEST COMP: CPT | Performed by: STUDENT IN AN ORGANIZED HEALTH CARE EDUCATION/TRAINING PROGRAM

## 2024-03-11 ENCOUNTER — OFFICE VISIT (OUTPATIENT)
Dept: ORTHOPEDIC SURGERY | Facility: CLINIC | Age: 74
End: 2024-03-11
Payer: MEDICARE

## 2024-03-11 DIAGNOSIS — G56.21 ULNAR NEUROPATHY AT ELBOW OF RIGHT UPPER EXTREMITY: ICD-10-CM

## 2024-03-11 PROCEDURE — 99442 PR PHYS/QHP TELEPHONE EVALUATION 11-20 MIN: CPT | Performed by: ORTHOPAEDIC SURGERY

## 2024-03-11 PROCEDURE — 1125F AMNT PAIN NOTED PAIN PRSNT: CPT | Performed by: ORTHOPAEDIC SURGERY

## 2024-03-11 PROCEDURE — 1159F MED LIST DOCD IN RCRD: CPT | Performed by: ORTHOPAEDIC SURGERY

## 2024-03-11 PROCEDURE — 1036F TOBACCO NON-USER: CPT | Performed by: ORTHOPAEDIC SURGERY

## 2024-03-11 PROCEDURE — 1160F RVW MEDS BY RX/DR IN RCRD: CPT | Performed by: ORTHOPAEDIC SURGERY

## 2024-03-11 NOTE — PROGRESS NOTES
History of Present Illness:  Chief Complaint   Patient presents with    Right Wrist - Follow-up     EMG review       73-year-old female recently completed right upper extremity EMG.  She reports continued numbness into her small and ring finger seem to be most involved.  As previously noted, patient does have history of underlying neuropathy.    Past Medical History:   Diagnosis Date    Carpal tunnel syndrome, bilateral upper limbs 04/11/2018    Bilateral carpal tunnel syndrome    Personal history of other diseases of the nervous system and sense organs 12/03/2018    History of peripheral neuropathy    Radiculopathy, cervical region 04/11/2018    Cervical radiculopathy at C5    Radiculopathy, cervical region 04/11/2018    Cervical radiculopathy at C6    Radiculopathy, cervical region 04/11/2018    Cervical radiculopathy at C7       Medication Documentation Review Audit       Reviewed by Racheal Irene CMA (Medical Assistant) on 03/11/24 at 1117      Medication Order Taking? Sig Documenting Provider Last Dose Status   amLODIPine (Norvasc) 2.5 mg tablet [id number]  Take 1 tablet (2.5 mg) by mouth once daily. Historical Provider, MD  Active   amLODIPine (Norvasc) 5 mg tablet [id number]  Take by mouth. Historical Provider, MD  Active   atenolol (Tenormin) 25 mg tablet [id number]  Take 1 tablet (25 mg) by mouth once daily. Historical Provider, MD  Active   cholecalciferol (Vitamin D-3) 50 MCG (2000 UT) tablet [id number]  Take 1 tablet (2,000 Units) by mouth once daily. Historical Provider, MD  Active   citalopram (CeleXA) 10 mg tablet [id number]  Take 1 tablet (10 mg) by mouth once daily. Historical Provider, MD  Active   gabapentin (Neurontin) 600 mg tablet [id number]  Take 1 tablet (600 mg) by mouth 3 times a day. Do not start before February 10, 2024. Elizabeth Lovett, APRN-CNP  Active   glipiZIDE XL (Glucotrol XL) 10 mg 24 hr tablet [id number]  Take 1 tablet (10 mg) by mouth 2 times a day. Historical Provider, MD  Active    hydroCHLOROthiazide (Microzide) 12.5 mg capsule [id number]  Take by mouth. Historical Provider, MD  Active   HYDROcodone-acetaminophen (Norco)  mg tablet [id number]  Take 1 tablet by mouth 4 times a day as needed for severe pain (7 - 10) for up to 28 days. Do not start before February 15, 2024. Elizabeth ARMSTRONG Rachell APRN-CNP  Active   HYDROcodone-acetaminophen (Norco)  mg tablet [id number]  Take 1 tablet by mouth 4 times a day as needed for severe pain (7 - 10) for up to 28 days. Do not start before 2024. Elizabeth Reyeslulya APRN-CNP  Active   HYDROcodone-acetaminophen (Norco)  mg tablet [id number]  Take 1 tablet by mouth 4 times a day as needed for severe pain (7 - 10) for up to 28 days. Luís Carrero MD   24 2359   HYDROcodone-acetaminophen (Norco) 7.5-325 mg tablet [id number] No Take 1 tablet by mouth 4 times a day as needed for severe pain (7 - 10). Do not start before 2023.   Patient not taking: Reported on 2024    Elizabeth LEANDRO Vega-CNP Not Taking Active   levothyroxine (Synthroid, Levoxyl) 50 mcg tablet [id number]  Take 1 tablet (50 mcg) by mouth once daily. Historical Provider, MD  Active   metFORMIN (Glucophage) 500 mg tablet [id number]  Take 1 tablet (500 mg) by mouth once daily. Historical Provider, MD  Active   naloxone (Narcan) 4 mg/0.1 mL nasal spray [id number]  Administer into affected nostril(s). Historical Provider, MD  Active   risedronate (Actonel) 150 mg tablet [id number]  Take 1 tablet (150 mg) by mouth every 30 (thirty) days.  take with 8 oz of water, first thing in AM, do not take any other meds or food for 1/2 hr. Stay upright Historical Provider, MD  Active   semaglutide (Rybelsus) 7 mg tablet [id number]  Take by mouth. Historical Provider, MD  Active                    Allergies   Allergen Reactions    Heparin Unknown    Versed [Midazolam] Confusion       Social History     Socioeconomic History    Marital status:      Spouse name: Not  on file    Number of children: Not on file    Years of education: Not on file    Highest education level: Not on file   Occupational History    Not on file   Tobacco Use    Smoking status: Former     Years: 25     Types: Cigarettes     Quit date:      Years since quittin.1    Smokeless tobacco: Never   Substance and Sexual Activity    Alcohol use: Not on file    Drug use: Not on file    Sexual activity: Not on file   Other Topics Concern    Not on file   Social History Narrative    Not on file     Social Determinants of Health     Financial Resource Strain: Not on file   Food Insecurity: Not on file   Transportation Needs: Not on file   Physical Activity: Not on file   Stress: Not on file   Social Connections: Not on file   Intimate Partner Violence: Not on file   Housing Stability: Not on file       Past Surgical History:   Procedure Laterality Date    BREAST SURGERY  2018    Breast Surgery Reduction Procedure    HYSTERECTOMY  2018    Hysterectomy    KNEE SURGERY  2018    Knee Surgery    NECK SURGERY  2018    Neck Surgery    STOMACH SURGERY  2018    Gastric Surgery        Review of Systems   GENERAL: Negative for malaise, significant weight loss, fever  MUSCULOSKELETAL: see HPI  NEURO: See HPI     EMG: Right upper extremity EMG from 2024 available for review demonstrates changes consistent with not localizable right ulnar neuropathy.  No specific slowing at the elbow.  There is involvement of dorsal ulnar sensory response suggesting ulnar nerve lesion above the wrist.  There is also evidence of motor and sensory peripheral polyneuropathy    Assessment:  Patient with right ulnar neuropathy in setting of underlying polyneuropathy.  Ulnar neuropathy not localizable on EMG     Plan:  EMG findings were reviewed with patient in the setting of her current clinical symptoms.  We discussed risks and benefits of various treatment options including ulnar nerve decompression at  the level of the elbow with likely plans for anterior transposition.  Given the localizable nature of the ulnar neuropathy on her EMG we will proceed with neuromuscular ultrasound before proceeding with surgical intervention.  Plan to review neuromuscular ultrasound results before deciding on further care and treatment course.    15 minutes on phone and reviewing EMG

## 2024-04-08 ENCOUNTER — OFFICE VISIT (OUTPATIENT)
Dept: PAIN MEDICINE | Facility: CLINIC | Age: 74
End: 2024-04-08
Payer: MEDICARE

## 2024-04-08 ENCOUNTER — APPOINTMENT (OUTPATIENT)
Dept: PAIN MEDICINE | Facility: CLINIC | Age: 74
End: 2024-04-08
Payer: MEDICARE

## 2024-04-08 VITALS — RESPIRATION RATE: 20 BRPM | HEART RATE: 71 BPM | DIASTOLIC BLOOD PRESSURE: 83 MMHG | SYSTOLIC BLOOD PRESSURE: 142 MMHG

## 2024-04-08 DIAGNOSIS — M96.1 POSTLAMINECTOMY SYNDROME, CERVICAL REGION: ICD-10-CM

## 2024-04-08 DIAGNOSIS — M47.812 CERVICAL SPONDYLOSIS WITHOUT MYELOPATHY: ICD-10-CM

## 2024-04-08 DIAGNOSIS — M48.02 CERVICAL STENOSIS OF SPINE: ICD-10-CM

## 2024-04-08 DIAGNOSIS — Z98.890 H/O CERVICAL SPINE SURGERY: ICD-10-CM

## 2024-04-08 DIAGNOSIS — M79.10 TENDON PAIN: ICD-10-CM

## 2024-04-08 DIAGNOSIS — G56.21: Primary | ICD-10-CM

## 2024-04-08 PROCEDURE — 20550 NJX 1 TENDON SHEATH/LIGAMENT: CPT | Performed by: ANESTHESIOLOGY

## 2024-04-08 PROCEDURE — 1036F TOBACCO NON-USER: CPT | Performed by: ANESTHESIOLOGY

## 2024-04-08 PROCEDURE — 3079F DIAST BP 80-89 MM HG: CPT | Performed by: ANESTHESIOLOGY

## 2024-04-08 PROCEDURE — 1160F RVW MEDS BY RX/DR IN RCRD: CPT | Performed by: ANESTHESIOLOGY

## 2024-04-08 PROCEDURE — 3077F SYST BP >= 140 MM HG: CPT | Performed by: ANESTHESIOLOGY

## 2024-04-08 PROCEDURE — 1159F MED LIST DOCD IN RCRD: CPT | Performed by: ANESTHESIOLOGY

## 2024-04-08 PROCEDURE — 1125F AMNT PAIN NOTED PAIN PRSNT: CPT | Performed by: ANESTHESIOLOGY

## 2024-04-08 RX ORDER — HYDROCODONE BITARTRATE AND ACETAMINOPHEN 10; 325 MG/1; MG/1
1 TABLET ORAL 4 TIMES DAILY PRN
Qty: 112 TABLET | Refills: 0 | Status: SHIPPED | OUTPATIENT
Start: 2024-04-14 | End: 2024-06-10 | Stop reason: SDUPTHER

## 2024-04-08 RX ORDER — GABAPENTIN 600 MG/1
600 TABLET ORAL 4 TIMES DAILY
Qty: 360 TABLET | Refills: 0 | Status: SHIPPED | OUTPATIENT
Start: 2024-04-08 | End: 2024-07-07

## 2024-04-08 RX ORDER — HYDROCODONE BITARTRATE AND ACETAMINOPHEN 10; 325 MG/1; MG/1
1 TABLET ORAL 4 TIMES DAILY PRN
Qty: 112 TABLET | Refills: 0 | Status: SHIPPED | OUTPATIENT
Start: 2024-04-09 | End: 2024-05-07

## 2024-04-08 RX ORDER — HYDROCODONE BITARTRATE AND ACETAMINOPHEN 10; 325 MG/1; MG/1
1 TABLET ORAL 4 TIMES DAILY PRN
Qty: 112 TABLET | Refills: 0 | Status: SHIPPED | OUTPATIENT
Start: 2024-05-12 | End: 2024-06-09

## 2024-04-08 ASSESSMENT — PAIN SCALES - GENERAL: PAINLEVEL_OUTOF10: 9

## 2024-04-08 ASSESSMENT — PAIN DESCRIPTION - DESCRIPTORS: DESCRIPTORS: SHARP;RADIATING

## 2024-04-08 ASSESSMENT — PAIN - FUNCTIONAL ASSESSMENT: PAIN_FUNCTIONAL_ASSESSMENT: 0-10

## 2024-04-08 NOTE — PROGRESS NOTES
Patient ID: Marylou Blas is a 73 y.o. female.    Nerve block    Date/Time: 4/8/2024 9:06 AM    Performed by: Luís Carrero MD  Authorized by: Luís Carrero MD    Consent:     Consent obtained:  Written    Consent given by:  Patient    Risks, benefits, and alternatives were discussed: yes      Risks discussed:  Allergic reaction, infection, nerve damage, pain, bleeding, intravenous injection, swelling and unsuccessful block  Universal protocol:     Procedure explained and questions answered to patient or proxy's satisfaction: yes      Relevant documents present and verified: yes      Test results available: yes      Imaging studies available: yes      Required blood products, implants, devices, and special equipment available: yes      Site/side marked: yes      Immediately prior to procedure, a time out was called: yes      Patient identity confirmed:  Hospital-assigned identification number  Indications:     Indications:  Pain relief  Location:     Body area:  Upper extremity    Upper extremity nerve blocked: ulnar.  Pre-procedure details:     Skin preparation:  Chlorhexidine    Preparation: Patient was prepped and draped in usual sterile fashion    Skin anesthesia:     Skin anesthesia method:  None  Procedure details:     Block needle gauge:  25 G    Anesthetic injected:  Bupivacaine 0.25% w/o epi    Steroid injected:  Methylprednisolone    Additive injected:  None    Injection procedure:  Anatomic landmarks identified, anatomic landmarks palpated, incremental injection, introduced needle and negative aspiration for blood  Post-procedure details:     Dressing:  Sterile dressing    Procedure completion:  Tolerated well, no immediate complications  Comments:      With the patient the sitting position the right elbow was prepped and draped in a sterile fashion.  Under landmark based lateral and medial epicondyles were identified.  A 25-gauge inch and a half needle was inserted into the groove 1.5 cc of  bupivacaine 0.25% mixed with 40 mg of Depo-Medrol was injected.  Patient prior to the procedure without any problems and was discharged home.

## 2024-05-13 ENCOUNTER — TELEPHONE (OUTPATIENT)
Dept: PAIN MEDICINE | Facility: CLINIC | Age: 74
End: 2024-05-13
Payer: MEDICARE

## 2024-05-13 NOTE — TELEPHONE ENCOUNTER
Patient called [facility] for her hydrocodone and they told her there is no current script sent in for the 05/12/24 refill, even though it shows done yesterday. Pt. Wants it sent again.    Called Pharmacy Rx is there and ready for Patient to . Called Patient she is aware rx is ready.

## 2024-05-24 ENCOUNTER — OFFICE VISIT (OUTPATIENT)
Dept: ORTHOPEDIC SURGERY | Facility: CLINIC | Age: 74
End: 2024-05-24
Payer: MEDICARE

## 2024-05-24 ENCOUNTER — HOSPITAL ENCOUNTER (OUTPATIENT)
Dept: RADIOLOGY | Facility: HOSPITAL | Age: 74
Discharge: HOME | End: 2024-05-24
Payer: MEDICARE

## 2024-05-24 ENCOUNTER — DOCUMENTATION (OUTPATIENT)
Dept: HOME HEALTH SERVICES | Facility: HOME HEALTH | Age: 74
End: 2024-05-24

## 2024-05-24 ENCOUNTER — HOME HEALTH ADMISSION (OUTPATIENT)
Dept: HOME HEALTH SERVICES | Facility: HOME HEALTH | Age: 74
End: 2024-05-24

## 2024-05-24 ENCOUNTER — TELEPHONE (OUTPATIENT)
Dept: HOME HEALTH SERVICES | Facility: HOME HEALTH | Age: 74
End: 2024-05-24

## 2024-05-24 DIAGNOSIS — G89.29 CHRONIC PAIN OF BOTH KNEES: Primary | ICD-10-CM

## 2024-05-24 DIAGNOSIS — M25.562 CHRONIC PAIN OF BOTH KNEES: Primary | ICD-10-CM

## 2024-05-24 DIAGNOSIS — G89.29 CHRONIC PAIN OF BOTH KNEES: ICD-10-CM

## 2024-05-24 DIAGNOSIS — M25.561 CHRONIC PAIN OF BOTH KNEES: Primary | ICD-10-CM

## 2024-05-24 DIAGNOSIS — M25.561 CHRONIC PAIN OF BOTH KNEES: ICD-10-CM

## 2024-05-24 DIAGNOSIS — M25.562 CHRONIC PAIN OF BOTH KNEES: ICD-10-CM

## 2024-05-24 PROCEDURE — 1036F TOBACCO NON-USER: CPT | Performed by: ORTHOPAEDIC SURGERY

## 2024-05-24 PROCEDURE — 99214 OFFICE O/P EST MOD 30 MIN: CPT | Performed by: ORTHOPAEDIC SURGERY

## 2024-05-24 PROCEDURE — 1160F RVW MEDS BY RX/DR IN RCRD: CPT | Performed by: ORTHOPAEDIC SURGERY

## 2024-05-24 PROCEDURE — 73560 X-RAY EXAM OF KNEE 1 OR 2: CPT | Mod: RT

## 2024-05-24 PROCEDURE — 73564 X-RAY EXAM KNEE 4 OR MORE: CPT | Mod: LT

## 2024-05-24 PROCEDURE — 20610 DRAIN/INJ JOINT/BURSA W/O US: CPT | Performed by: ORTHOPAEDIC SURGERY

## 2024-05-24 PROCEDURE — 1159F MED LIST DOCD IN RCRD: CPT | Performed by: ORTHOPAEDIC SURGERY

## 2024-05-24 RX ORDER — TRIAMCINOLONE ACETONIDE 40 MG/ML
2.5 INJECTION, SUSPENSION INTRA-ARTICULAR; INTRAMUSCULAR
Status: COMPLETED | OUTPATIENT
Start: 2024-05-24 | End: 2024-05-24

## 2024-05-24 RX ADMIN — TRIAMCINOLONE ACETONIDE 2.5 MG: 40 INJECTION, SUSPENSION INTRA-ARTICULAR; INTRAMUSCULAR at 12:29

## 2024-05-24 NOTE — TELEPHONE ENCOUNTER
This referral has been made a Non Admit with  Home Care due to Patient's Home is Outside of [facility] Service Area. If you have further questions, feel free to reach out to our office at [phone]. Thank you, [facility] Intake.

## 2024-05-24 NOTE — PROGRESS NOTES
This is a consultation from Dr. Adria Cortez MD for   Chief Complaint   Patient presents with   • Left Knee - Pain   • Right Knee - Pain       This is a 73 y.o. female who presents for follow-up for her bilateral knees.  Patient has bilateral knee arthritis, she cortisone injection several months ago.  Gave her relief and lasted for a while.  In last few weeks she has had return of her symptoms exacerbation of her pain.  Sharp pain over the medial knee worse with walking proving with rest.  No numbness or tingling no fevers or chills.  She is working on glucose control.  He is having a difficult time getting around.    Physical Exam    There has been no interval change in this patient's past medical, surgical, medications, allergies, family history or social history since the most recent visit to a provider within our department. 14 point review of systems was performed, reviewed, and negative except for pertinent positives documented in the history of present illness.     Constitutional: well developed, well nourished female in no acute distress  Psychiatric: normal mood, appropriate affect  Eyes: sclera anicteric  HENT: normocephalic/atraumatic  CV: regular rate and rhythm   Respiratory: non labored breathing  Integumentary: no rash  Neurological: moves all extremities    Bilateral knee exam: skin intact no lacerations or abrations.  1+ effusion.  Tender medial joint line. negative log roll negative patellar grind. ROM 0-120. stable to varus and valgus stress at 0 and 30 degrees. negative lachman negative posterior drawer negative phoebe. 5/5 ehl/fhl/gs/ta. silt s/s/sp/dp/t. 2+ dp/pt        Xrays were ordered by me, they were reviewed and independently interpreted by me today, they show severe degenerative disease bone-on-bone arthritis bilaterally    L Inj/Asp: bilateral knee on 5/24/2024 12:29 PM  Indications: pain and joint swelling  Details: 22 G needle, anterolateral approach  Medications (Right): 2.5  mg triamcinolone acetonide 40 mg/mL  Medications (Left): 2.5 mg triamcinolone acetonide 40 mg/mL    Discussion:  I discussed the conservative treatment options for knee osteoarthritis including but not limited to physical therapy, oral NSAIDS, activity and lifestyle modification, and corticosteroid injections. Pt has elected to undergo a cortisone injection today. I have explained the risk and benefits of an injection including the possibility of joint infection, bleeding, damage to cartilage, allergic reaction. Patient verbalized understanding and gave verbal consent wishes to proceed with a intra-articular cortisone injection for their knee.    Procedure:  After discussing the risk and benefits of the procedure, we proceeded with an intra-articular bilateral knee injection. We discussed the risks and benefits and potential morbidity related to the treatment, and to the prescription medication administered in the injection    With the patient's informed verbal consent, the bilateral knees were prepped in standard sterile fashion with Chlorhexidine. The skin was then anesthetized with ethyl chloride spray and cleaned again with Chlorhexidine. The bilateral knees were then apirated/injected with a prefilled 20-gauge syringe of 40 mg Kenalog + 4 ml Lidocaine using the lateral approach without complications.  The patient tolerated this well and felt immediate initial relief of symptoms. A bandaid was applied and the patient ambulated out of the clinic on ther own accord without difficulty. Patient was instructed to avoid physical activity for 24-48 hours to prevent the knees from swelling and may ice the knees as tolerated. Patient should contact the office if any signs of of infection appear: redness, fever, chills, drainage, swelling or warmth to the knees.  Pt understands that the injections can be repeated no sooner than 3 months.      Procedure, treatment alternatives, risks and benefits explained, specific risks  "discussed. Consent was given by the patient. Immediately prior to procedure a time out was called to verify the correct patient, procedure, equipment, support staff and site/side marked as required. Patient was prepped and draped in the usual sterile fashion.         Impression/Plan: This is a 73 y.o. female with severe bilateral knee arthritis.  I had an in depth discussion with the patient regarding treatment options for arthritis and their relative risks and benefits. We reviewed surgical and nonsurgical option for treatment. Treatments include anti inflammatory medications, physical therapy, weight loss, activity modification, use of assistive devices, injection therapies. We discussed current prescriptions and risks and benefits of continuation of prescription medication as apporpriate. We discussed that arthritis is often progressive over time, an in end stage arthritis surgical interventions can be considered, including arthroplasty. All questions were answered and the patient voiced their understanding.  Will see her back.    BMI Readings from Last 1 Encounters:   11/17/23 36.02 kg/m²      Lab Results   Component Value Date    CREATININE 0.70 01/07/2022     Tobacco Use: Medium Risk (5/24/2024)    Patient History    • Smoking Tobacco Use: Former    • Smokeless Tobacco Use: Never    • Passive Exposure: Not on file      Computed MELD 3.0 unavailable. One or more values for this score either were not found within the given timeframe or did not fit some other criterion.  Computed MELD-Na unavailable. One or more values for this score either were not found within the given timeframe or did not fit some other criterion.       Lab Results   Component Value Date    HGBA1C 8.2 (A) 06/13/2023     No results found for: \"STAPHMRSASCR\"  "

## 2024-06-04 ENCOUNTER — PROCEDURE VISIT (OUTPATIENT)
Dept: NEUROLOGY | Facility: CLINIC | Age: 74
End: 2024-06-04
Payer: MEDICARE

## 2024-06-04 DIAGNOSIS — M79.601 PAIN OF RIGHT UPPER EXTREMITY: Primary | ICD-10-CM

## 2024-06-04 PROCEDURE — 76883 US NRV&ACC STRUX 1XTR COMPRE: CPT | Performed by: STUDENT IN AN ORGANIZED HEALTH CARE EDUCATION/TRAINING PROGRAM

## 2024-06-04 NOTE — PROGRESS NOTES
NEUROMUSCULAR ULTRASOUND OF THE RIGHT UPPER EXTREMITY    INDICATION:   Clinical Information: Evaluate for right ulnar neuropathy. She presents with 6-month course of numbness and painful paresthesias in her hands, and underwent EMG in March 2024 with findings of a nonlocalizing right ulnar neuropathy.    Neuromuscular ultrasound to be performed:  (a) to evaluate the echotexture and size of the right ulnar nerve throughout its course in the limb;   (b) to assess for any identifiable mechanical source of ulnar nerve compression;   (c) to identify any dynamic source of neuropathy from nerve subluxation or dislocation;  (d) to assess adjacent structures around the right elbow for abnormalities     HEIGHT: 5 ft 7 in  WEIGHT: 200 lbs.  HANDEDNESS: Right    COMPARISON:  None.    TECHNIQUE:  The right ulnar nerve and accompanying structures were studied throughout their entire anatomic course in the limb from the wrist to the axilla, including real-time cine imaging. The study was performed using a ResoServ P9 ultrasound machine with a 15-6 MHz matrix linear transducer. Cross sectional area (CSA) was measured within the epineurium, using the trace method. At locations where repeat measurements were taken, the mean value is reported below. Transverse and longitudinal images were obtained. For comparison purposes, the right median nerve was first examined at the distal wrist crease and in the mid-forearm, both in transverse and longitudinal views. For the median nerve, the patient was examined supine with the arm extended and supinated. For the ulnar nerve, the patient was placed supine with the arms externally rotated, abducted, and slightly flexed at the elbow.  With the elbow extended, the transducer was placed at the level of the medial epicondyle as the patient´s elbow was passively flexed to determine if either ulnar nerve subluxed or dislocated out of the groove.    FINDINGS:  At the right wrist at the distal wrist crease  (proximal carpal tunnel), the median nerve CSA was  13.1 mm2 (NL < 10 mm2; borderline 10-13 mm2; ABN > 13 mm2).     The echogenicity of the median nerve was normal. The mobility of the median nerve with flexion and extension of the fingers was normal. Color Doppler of the median nerve showed normal median nerve vascularity. No abnormal tenosynovitis of the flexor tendons was noted.    Using a longitudinal scan of the right median nerve at the wrist, a notch sign was not seen under the flexor retinaculum.     A persistent median artery was not present. A bifid median nerve was not present. No other abnormal mass or ganglia was appreciated in the carpal tunnel. With extension of the fingers, there was no abnormal intrusion of the flexor digitorum sublimis. With flexion of the fingers, there was no abnormal intrusion of the lumbrical muscles.    In the mid-forearm, approximately 12 cm proximal to the distal wrist crease, the median nerve was seen between the flexor digitorum sublimis and flexor digitorum profundus muscles. At the mid-forearm, the median nerve CSA was 10.7 mm2. The ratio of the median CSAs between the wrist and forearm (WFR) was 1.2 (NL < 1.5; borderline: 1.5 - 2.0). The echogenicity of the median nerve in the forearm was normal.    The median nerve was then followed proximal into the forearm to the antecubital fossa. The median nerve was normal in size and echogenicity adjacent to the brachial artery.     Scanning the muscles, the echogenicity was normal of the abductor pollicis brevis, flexor digitorum sublimis, flexor digitorum profundus, flexor pollicis longus, flexor carpi radialis, and pronator teres. There was increased echogenicity of the deep head of the flexor pollicis brevis and adductor pollicis.     Attention was then turned to the ulnar nerve. At the wrist, the ulnar CSA was 6.6 mm2 (NL < 10 mm2). The echogenicity was normal. An accessory abductor digiti minimi muscle was present.    At  the mid-forearm slightly proximal to the location where the ulnar artery  from the ulnar nerve, the CSA was 7.8 mm2 (NL < 10 mm2). The echogenicity was normal.    At the level of cubital tunnel, the ulnar nerve with the largest CSA was located in between the two heads of the flexor carpi ulnaris. The CSA at this location was 9.2 mm2 (NL < 10 mm2; mild ABN 10-15 mm2; moderate ABN 15-20 mm2; severely ABN > 20 mm2). The echogenicity was normal.    At the level of retrocondylar groove, the ulnar nerve with the largest CSA was located between the medial epicondyle and olecranon. The CSA at this location was 12.5 mm2 (NL < 10 mm2; mild ABN 10-15 mm2; moderate ABN 15-20 mm2; severely ABN > 20 mm2). The echogenicity was reduced.    No abnormal mass was appreciated affecting the ulnar nerve in the elbow. An anconeus epitrochlearis muscle was not appreciated at the elbow.     At the mid-arm under the fascia of the medial triceps, the CSA was 9.7 mm2 (NL < 10 mm2). The echogenicity was normal. The ulnar nerve was the followed to the axilla and was normal.    The ratio of the largest CSAs between the elbow and mid-forearm was 1.6 (NL < 1.5).    The ratio of the largest CSAs between the elbow and mid-arm was 1.3 (NL < 1.5).    Scanning the ulnar muscles, the echogenicity was increased in the deep head of the flexor pollicis brevis, and to a lesser degree, the medial flexor digitorum profundus and flexor carpi ulnaris.    IMPRESSION:  This is a mildly abnormal neuromuscular ultrasound examination of the right upper extremity.     There was ultrasound evidence of a mild ulnar neuropathy at the elbow. The location of the lesion was at the retrocondylar groove.  The ulnar nerve was followed through its anatomic course in the limb from wrist to mid-arm and was otherwise normal.    For comparison purposes and to assess for additional pathology, the median nerve was also studied and was borderline to mildly increased in size,  which can be a normal finding in individuals with larger body habitus.     The other visualized osseous, ligamentous and tendinous structures appeared normal.    Archie Dela Cruz MD  Neuromuscular Fellow    Santi Tesfaye DO  Neuromuscular Attending        Performed by: Archie Dela Cruz MD  Authorized by: Santi Tesfaye DO

## 2024-06-10 ENCOUNTER — OFFICE VISIT (OUTPATIENT)
Dept: ORTHOPEDIC SURGERY | Facility: CLINIC | Age: 74
End: 2024-06-10
Payer: MEDICARE

## 2024-06-10 ENCOUNTER — TELEPHONE (OUTPATIENT)
Dept: PAIN MEDICINE | Facility: CLINIC | Age: 74
End: 2024-06-10

## 2024-06-10 VITALS — BODY MASS INDEX: 36.02 KG/M2 | WEIGHT: 230 LBS

## 2024-06-10 DIAGNOSIS — Z98.890 H/O CERVICAL SPINE SURGERY: ICD-10-CM

## 2024-06-10 DIAGNOSIS — G56.21 ULNAR NEUROPATHY AT ELBOW OF RIGHT UPPER EXTREMITY: ICD-10-CM

## 2024-06-10 DIAGNOSIS — M47.812 CERVICAL SPONDYLOSIS WITHOUT MYELOPATHY: ICD-10-CM

## 2024-06-10 DIAGNOSIS — M48.02 CERVICAL STENOSIS OF SPINE: ICD-10-CM

## 2024-06-10 PROCEDURE — 1160F RVW MEDS BY RX/DR IN RCRD: CPT | Performed by: ORTHOPAEDIC SURGERY

## 2024-06-10 PROCEDURE — 99213 OFFICE O/P EST LOW 20 MIN: CPT | Performed by: ORTHOPAEDIC SURGERY

## 2024-06-10 PROCEDURE — 1159F MED LIST DOCD IN RCRD: CPT | Performed by: ORTHOPAEDIC SURGERY

## 2024-06-10 RX ORDER — HYDROCODONE BITARTRATE AND ACETAMINOPHEN 10; 325 MG/1; MG/1
1 TABLET ORAL 4 TIMES DAILY PRN
Qty: 112 TABLET | Refills: 0 | Status: SHIPPED | OUTPATIENT
Start: 2024-06-10 | End: 2024-07-08

## 2024-06-11 NOTE — PROGRESS NOTES
History of Present Illness:  Chief Complaint   Patient presents with    Right Hand - Pain, Numbness     Nm us      73-year-old female presents in follow-up for right ulnar neuropathy.  She is accompanied today by her daughter.  She continues to have numbness and tingling into the small and ring fingers.  She also describes a burning sensation into the tips of all of her fingers.  No specific numbness into radial 3 digits.  She has tried avoiding prolonged elbow flexion and pressure on her medial elbow, but has remained symptomatic.    Past Medical History:   Diagnosis Date    Carpal tunnel syndrome, bilateral upper limbs 04/11/2018    Bilateral carpal tunnel syndrome    Personal history of other diseases of the nervous system and sense organs 12/03/2018    History of peripheral neuropathy    Radiculopathy, cervical region 04/11/2018    Cervical radiculopathy at C5    Radiculopathy, cervical region 04/11/2018    Cervical radiculopathy at C6    Radiculopathy, cervical region 04/11/2018    Cervical radiculopathy at C7       Medication Documentation Review Audit       Reviewed by Manjinder Looney MD (Physician) on 06/10/24 at 1043      Medication Order Taking? Sig Documenting Provider Last Dose Status   amLODIPine (Norvasc) 2.5 mg tablet [id number] No Take 1 tablet (2.5 mg) by mouth once daily. Historical Provider, MD Taking Active   amLODIPine (Norvasc) 5 mg tablet [id number] No Take by mouth. Historical Provider, MD Taking Active   atenolol (Tenormin) 25 mg tablet [id number] No Take 1 tablet (25 mg) by mouth once daily. Historical Provider, MD Taking Active   cholecalciferol (Vitamin D-3) 50 MCG (2000 UT) tablet [id number] No Take 1 tablet (2,000 Units) by mouth once daily. Historical Provider, MD Taking Active   citalopram (CeleXA) 10 mg tablet [id number] No Take 1 tablet (10 mg) by mouth once daily. Historical Provider, MD Taking Active   gabapentin (Neurontin) 600 mg tablet [id number]  Take 1 tablet (600 mg) by mouth 4  times a day. Luís Carrero MD  Active   glipiZIDE XL (Glucotrol XL) 10 mg 24 hr tablet [id number] No Take 1 tablet (10 mg) by mouth 2 times a day. Historical Provider, MD Taking Active   hydroCHLOROthiazide (Microzide) 12.5 mg capsule [id number] No Take by mouth. Historical Provider, MD Taking Active   HYDROcodone-acetaminophen (Norco)  mg tablet [id number]  Take 1 tablet by mouth 4 times a day as needed for severe pain (7 - 10) for up to 28 days. Do not start before 2024. Luís Carrero MD   24 235   HYDROcodone-acetaminophen (Norco)  mg tablet [id number]  Take 1 tablet by mouth 4 times a day as needed for severe pain (7 - 10) for up to 28 days. Do not start before May 12, 2024. Luís Carrero MD   24   HYDROcodone-acetaminophen (Norco)  mg tablet [id number]  Take 1 tablet by mouth 4 times a day as needed for severe pain (7 - 10) for up to 28 days. Do not start before 2024. Luís Carrero MD  Active   HYDROcodone-acetaminophen (Norco) 7.5-325 mg tablet [id number] No Take 1 tablet by mouth 4 times a day as needed for severe pain (7 - 10). Do not start before 2023. LEANDRO Agrawal-CNP Taking Active   levothyroxine (Synthroid, Levoxyl) 50 mcg tablet [id number] No Take 1 tablet (50 mcg) by mouth once daily. Historical Provider, MD Taking Active   metFORMIN (Glucophage) 500 mg tablet [id number] No Take 1 tablet (500 mg) by mouth once daily. Historical Provider, MD Taking Active   naloxone (Narcan) 4 mg/0.1 mL nasal spray [id number] No Administer into affected nostril(s). Historical Provider, MD Taking Active   risedronate (Actonel) 150 mg tablet [id number] No Take 1 tablet (150 mg) by mouth every 30 (thirty) days.  take with 8 oz of water, first thing in AM, do not take any other meds or food for 1/2 hr. Stay upright Historical Provider, MD Taking Active   semaglutide (Rybelsus) 7 mg tablet [id number] No Take by mouth. Historical  Provider, MD Taking Active                    Allergies   Allergen Reactions    Heparin Unknown    Versed [Midazolam] Confusion       Social History     Socioeconomic History    Marital status:      Spouse name: Not on file    Number of children: Not on file    Years of education: Not on file    Highest education level: Not on file   Occupational History    Not on file   Tobacco Use    Smoking status: Former     Current packs/day: 0.00     Types: Cigarettes     Start date:      Quit date:      Years since quittin.4    Smokeless tobacco: Never   Substance and Sexual Activity    Alcohol use: Not on file    Drug use: Not on file    Sexual activity: Not on file   Other Topics Concern    Not on file   Social History Narrative    Not on file     Social Determinants of Health     Financial Resource Strain: Not on file   Food Insecurity: Not on file   Transportation Needs: Not on file   Physical Activity: Not on file   Stress: Not on file   Social Connections: Not on file   Intimate Partner Violence: Not on file   Housing Stability: Not on file       Past Surgical History:   Procedure Laterality Date    BREAST SURGERY  2018    Breast Surgery Reduction Procedure    HYSTERECTOMY  2018    Hysterectomy    KNEE SURGERY  2018    Knee Surgery    NECK SURGERY  2018    Neck Surgery    STOMACH SURGERY  2018    Gastric Surgery        Review of Systems   GENERAL: Negative for malaise, significant weight loss, fever  MUSCULOSKELETAL: see HPI  NEURO: See HPI    Constitutional: Appears well-developed and well-nourished.  Head: Normocephalic and atraumatic.  Eyes: EOMI grossly  Cardiovascular: Intact distal pulses.   Respiratory: Effort normal. No respiratory distress.  Neurologic: Alert and oriented to person, place, and time.  Skin: Skin is warm and dry.  Hematologic / Lymphatic: No lymphedema, lymphangitis.  Psychiatric: normal mood and affect. Behavior is normal.    Musculoskeletal:  Right hand/arm: Skin intact.  Normal rugal patterns.  Elbow with good range of motion.  Reported tenderness about ulnar nerve at level of cubital tunnel.  Positive elbow flexion test.  Positive Tinel's at level of cubital tunnel.  Diminished sensation subjectively into tips of digits that is more pronounced in the small and ring fingers.  5/5 finger abduction.  5/5 thumb abduction.  Negative Wartenberg's.  Negative Durkan's/Phalen's testing.  Positive Tinel's about carpal tunnel that travels into index/middle fingers.    EMG: Right upper extremity neuromuscular ultrasound from June 5, 2024 available for my review.  EMG demonstrates evidence of mild ulnar neuropathy at the level of the elbow.  Best localized to retrocondylar groove.  No specific changes about the median nerve.    Assessment:  Patient with right ulnar neuropathy in setting of underlying polyneuropathy.  Neuromuscular ultrasound ultra lyses ulnar neuropathy to level of retrocondylar groove.    Plan: Neuromuscular ultrasound findings were reviewed with patient and her daughter.  We discussed risks and benefits of continued nonoperative versus operative treatment options.  Surgical intervention would hopefully help the specific ulnar nerve compression that is occurring at her elbow, but we did discuss that it will not address her underlying neuropathy and pains associated with that.  We thoroughly discussed risks and benefits of continued nonoperative versus operative treatment options.  They understand that the goal of treatment is to prevent further worsening and that she may have some degree of persistent symptoms.  They are unsure how they would like to proceed at this point and wished to further discuss before deciding how to proceed.  They would like to try some additional nonoperative modalities and therapy has been ordered for assistance with home exercise program for nerve gliding exercises.  Plan for further follow-up versus  surgical scheduling if nerve gliding exercises are not helpful or if they wish to pursue additional treatment options.

## 2024-06-28 ENCOUNTER — OFFICE VISIT (OUTPATIENT)
Dept: PAIN MEDICINE | Facility: CLINIC | Age: 74
End: 2024-06-28
Payer: MEDICARE

## 2024-06-28 VITALS
SYSTOLIC BLOOD PRESSURE: 138 MMHG | RESPIRATION RATE: 17 BRPM | OXYGEN SATURATION: 95 % | HEART RATE: 76 BPM | DIASTOLIC BLOOD PRESSURE: 74 MMHG

## 2024-06-28 DIAGNOSIS — M47.812 CERVICAL SPONDYLOSIS WITHOUT MYELOPATHY: ICD-10-CM

## 2024-06-28 DIAGNOSIS — G56.21 ULNAR NERVE COMPRESSION AT MULTIPLE LEVELS, RIGHT: ICD-10-CM

## 2024-06-28 DIAGNOSIS — M96.1 POSTLAMINECTOMY SYNDROME, CERVICAL REGION: Primary | ICD-10-CM

## 2024-06-28 DIAGNOSIS — M48.02 CERVICAL STENOSIS OF SPINE: ICD-10-CM

## 2024-06-28 DIAGNOSIS — M96.1 POSTLAMINECTOMY SYNDROME OF CERVICAL REGION: ICD-10-CM

## 2024-06-28 PROCEDURE — 99214 OFFICE O/P EST MOD 30 MIN: CPT | Performed by: NURSE PRACTITIONER

## 2024-06-28 PROCEDURE — 1160F RVW MEDS BY RX/DR IN RCRD: CPT | Performed by: NURSE PRACTITIONER

## 2024-06-28 PROCEDURE — 3075F SYST BP GE 130 - 139MM HG: CPT | Performed by: NURSE PRACTITIONER

## 2024-06-28 PROCEDURE — 1036F TOBACCO NON-USER: CPT | Performed by: NURSE PRACTITIONER

## 2024-06-28 PROCEDURE — 1125F AMNT PAIN NOTED PAIN PRSNT: CPT | Performed by: NURSE PRACTITIONER

## 2024-06-28 PROCEDURE — 3078F DIAST BP <80 MM HG: CPT | Performed by: NURSE PRACTITIONER

## 2024-06-28 PROCEDURE — 1159F MED LIST DOCD IN RCRD: CPT | Performed by: NURSE PRACTITIONER

## 2024-06-28 RX ORDER — PREDNISONE 20 MG/1
20 TABLET ORAL 2 TIMES DAILY
Qty: 10 TABLET | Refills: 0 | Status: SHIPPED | OUTPATIENT
Start: 2024-06-28 | End: 2024-07-03

## 2024-06-28 RX ORDER — HYDROCODONE BITARTRATE AND ACETAMINOPHEN 10; 325 MG/1; MG/1
1 TABLET ORAL 4 TIMES DAILY PRN
Qty: 112 TABLET | Refills: 0 | Status: SHIPPED | OUTPATIENT
Start: 2024-09-01 | End: 2024-09-29

## 2024-06-28 RX ORDER — LIDOCAINE AND PRILOCAINE 25; 25 MG/G; MG/G
CREAM TOPICAL
Qty: 30 G | Refills: 0 | Status: SHIPPED | OUTPATIENT
Start: 2024-06-28

## 2024-06-28 RX ORDER — HYDROCODONE BITARTRATE AND ACETAMINOPHEN 10; 325 MG/1; MG/1
1 TABLET ORAL 4 TIMES DAILY PRN
Qty: 112 TABLET | Refills: 0 | Status: SHIPPED | OUTPATIENT
Start: 2024-07-08 | End: 2024-08-05

## 2024-06-28 RX ORDER — GABAPENTIN 600 MG/1
600 TABLET ORAL 3 TIMES DAILY
Qty: 270 TABLET | Refills: 1 | Status: SHIPPED | OUTPATIENT
Start: 2024-07-09 | End: 2025-01-05

## 2024-06-28 RX ORDER — HYDROCODONE BITARTRATE AND ACETAMINOPHEN 10; 325 MG/1; MG/1
1 TABLET ORAL 4 TIMES DAILY PRN
Qty: 112 TABLET | Refills: 0 | Status: SHIPPED | OUTPATIENT
Start: 2024-08-05 | End: 2024-09-02

## 2024-06-28 ASSESSMENT — ENCOUNTER SYMPTOMS
NUMBNESS: 0
TREMORS: 0
EYES NEGATIVE: 1
DEPRESSION: 1
NECK STIFFNESS: 0
RESPIRATORY NEGATIVE: 1
DIZZINESS: 0
ARTHRALGIAS: 1
LIGHT-HEADEDNESS: 0
CARDIOVASCULAR NEGATIVE: 1
HEMATOLOGIC/LYMPHATIC NEGATIVE: 1
FACIAL ASYMMETRY: 0
BACK PAIN: 1
ALLERGIC/IMMUNOLOGIC NEGATIVE: 1
MYALGIAS: 1
SEIZURES: 0
CONSTITUTIONAL NEGATIVE: 1
JOINT SWELLING: 0
LOSS OF SENSATION IN FEET: 1
PAIN: 1
ENDOCRINE NEGATIVE: 1
NECK PAIN: 1
SPEECH DIFFICULTY: 0
OCCASIONAL FEELINGS OF UNSTEADINESS: 1
HEADACHES: 1
PSYCHIATRIC NEGATIVE: 1
GASTROINTESTINAL NEGATIVE: 1
WEAKNESS: 0

## 2024-06-28 ASSESSMENT — PAIN SCALES - GENERAL: PAINLEVEL: 8
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
## 2024-08-08 ENCOUNTER — TELEPHONE (OUTPATIENT)
Dept: NEUROSURGERY | Facility: HOSPITAL | Age: 74
End: 2024-08-08
Payer: MEDICARE

## 2024-08-08 NOTE — TELEPHONE ENCOUNTER
Attempted to speak to Marylou Blas about her upcoming apt with Dr. Villeal as to evaluate details for appointment. Pt has plan for follow up imaging in November 2024. Message left a few days ago with no return call. Attempted to follow up today and all her her contact phone numbers were unable to be reached and unable to leave messages.

## 2024-08-12 ENCOUNTER — APPOINTMENT (OUTPATIENT)
Dept: NEUROSURGERY | Facility: CLINIC | Age: 74
End: 2024-08-12
Payer: MEDICARE

## 2024-08-12 ENCOUNTER — TELEPHONE (OUTPATIENT)
Dept: NEUROSURGERY | Facility: HOSPITAL | Age: 74
End: 2024-08-12
Payer: MEDICARE

## 2024-08-12 DIAGNOSIS — D32.9 MENINGIOMA (MULTI): ICD-10-CM

## 2024-08-12 NOTE — TELEPHONE ENCOUNTER
I had the pleasure of speaking with Ms. Blas. She noted that she has been falling more in the past few weeks. She is due for follow up imaging form Meningioma surveillance in November 2024.   Per Dr. Villela:    Lets bump up her MRI w wo to be done now for observation due to her recent falls..      Mr. Blas agreed with the plan and will call back with any questions, concerns, or updates

## 2024-08-30 ENCOUNTER — HOSPITAL ENCOUNTER (OUTPATIENT)
Dept: RADIOLOGY | Facility: HOSPITAL | Age: 74
Discharge: HOME | End: 2024-08-30
Payer: MEDICARE

## 2024-08-30 DIAGNOSIS — D32.9 MENINGIOMA (MULTI): ICD-10-CM

## 2024-08-30 PROCEDURE — 2550000001 HC RX 255 CONTRASTS: Performed by: NEUROLOGICAL SURGERY

## 2024-08-30 PROCEDURE — A9575 INJ GADOTERATE MEGLUMI 0.1ML: HCPCS | Performed by: NEUROLOGICAL SURGERY

## 2024-08-30 PROCEDURE — 70553 MRI BRAIN STEM W/O & W/DYE: CPT

## 2024-08-30 RX ORDER — GADOTERATE MEGLUMINE 376.9 MG/ML
0.2 INJECTION INTRAVENOUS
Status: COMPLETED | OUTPATIENT
Start: 2024-08-30 | End: 2024-08-30

## 2024-09-19 ENCOUNTER — OFFICE VISIT (OUTPATIENT)
Dept: PAIN MEDICINE | Facility: CLINIC | Age: 74
End: 2024-09-19
Payer: MEDICARE

## 2024-09-19 VITALS
DIASTOLIC BLOOD PRESSURE: 79 MMHG | HEART RATE: 79 BPM | SYSTOLIC BLOOD PRESSURE: 127 MMHG | OXYGEN SATURATION: 96 % | RESPIRATION RATE: 17 BRPM

## 2024-09-19 DIAGNOSIS — G56.21 ULNAR NERVE COMPRESSION AT MULTIPLE LEVELS, RIGHT: ICD-10-CM

## 2024-09-19 DIAGNOSIS — Z79.891 ENCOUNTER FOR LONG-TERM USE OF OPIATE ANALGESIC: ICD-10-CM

## 2024-09-19 DIAGNOSIS — M96.1 POSTLAMINECTOMY SYNDROME, CERVICAL REGION: Primary | ICD-10-CM

## 2024-09-19 DIAGNOSIS — M47.812 CERVICAL SPONDYLOSIS WITHOUT MYELOPATHY: ICD-10-CM

## 2024-09-19 DIAGNOSIS — M48.02 CERVICAL STENOSIS OF SPINE: ICD-10-CM

## 2024-09-19 PROCEDURE — 99213 OFFICE O/P EST LOW 20 MIN: CPT | Performed by: NURSE PRACTITIONER

## 2024-09-19 PROCEDURE — 3078F DIAST BP <80 MM HG: CPT | Performed by: NURSE PRACTITIONER

## 2024-09-19 PROCEDURE — 1125F AMNT PAIN NOTED PAIN PRSNT: CPT | Performed by: NURSE PRACTITIONER

## 2024-09-19 PROCEDURE — 1160F RVW MEDS BY RX/DR IN RCRD: CPT | Performed by: NURSE PRACTITIONER

## 2024-09-19 PROCEDURE — 3074F SYST BP LT 130 MM HG: CPT | Performed by: NURSE PRACTITIONER

## 2024-09-19 PROCEDURE — 1036F TOBACCO NON-USER: CPT | Performed by: NURSE PRACTITIONER

## 2024-09-19 PROCEDURE — 1159F MED LIST DOCD IN RCRD: CPT | Performed by: NURSE PRACTITIONER

## 2024-09-19 RX ORDER — HYDROCODONE BITARTRATE AND ACETAMINOPHEN 10; 325 MG/1; MG/1
1 TABLET ORAL 4 TIMES DAILY PRN
Qty: 112 TABLET | Refills: 0 | Status: SHIPPED | OUTPATIENT
Start: 2024-12-02 | End: 2024-12-30

## 2024-09-19 RX ORDER — HYDROCODONE BITARTRATE AND ACETAMINOPHEN 10; 325 MG/1; MG/1
1 TABLET ORAL 4 TIMES DAILY PRN
Qty: 112 TABLET | Refills: 0 | Status: SHIPPED | OUTPATIENT
Start: 2024-11-04 | End: 2024-12-02

## 2024-09-19 RX ORDER — HYDROCODONE BITARTRATE AND ACETAMINOPHEN 10; 325 MG/1; MG/1
1 TABLET ORAL 4 TIMES DAILY PRN
Qty: 112 TABLET | Refills: 0 | Status: SHIPPED | OUTPATIENT
Start: 2024-10-07 | End: 2024-11-04

## 2024-09-19 RX ORDER — NALOXONE HYDROCHLORIDE 4 MG/.1ML
1 SPRAY NASAL AS NEEDED
Qty: 2 EACH | Refills: 0 | Status: SHIPPED | OUTPATIENT
Start: 2024-09-19

## 2024-09-19 RX ORDER — LIDOCAINE AND PRILOCAINE 25; 25 MG/G; MG/G
CREAM TOPICAL
Qty: 30 G | Refills: 2 | Status: SHIPPED | OUTPATIENT
Start: 2024-09-19

## 2024-09-19 ASSESSMENT — ENCOUNTER SYMPTOMS
ALLERGIC/IMMUNOLOGIC NEGATIVE: 1
NECK PAIN: 1
WEAKNESS: 0
FACIAL ASYMMETRY: 0
EYES NEGATIVE: 1
PAIN: 1
HEMATOLOGIC/LYMPHATIC NEGATIVE: 1
ARTHRALGIAS: 1
PSYCHIATRIC NEGATIVE: 1
LIGHT-HEADEDNESS: 0
NUMBNESS: 0
TREMORS: 0
NECK STIFFNESS: 0
SPEECH DIFFICULTY: 0
JOINT SWELLING: 0
HEADACHES: 1
MYALGIAS: 1
GASTROINTESTINAL NEGATIVE: 1
CONSTITUTIONAL NEGATIVE: 1
ENDOCRINE NEGATIVE: 1
RESPIRATORY NEGATIVE: 1
DIZZINESS: 0
SEIZURES: 0
CARDIOVASCULAR NEGATIVE: 1
BACK PAIN: 1

## 2024-09-19 ASSESSMENT — PAIN SCALES - GENERAL: PAINLEVEL: 8

## 2024-09-19 NOTE — PROGRESS NOTES
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     There is no evidence of fracture. No osseous lesion.     IMPRESSION:  Advanced arthritic changes right knee worst medially. Likely loose  body in a Baker's cyst.     Moderate arthritic changes left knee worst medially with  chondrocalcinosis suggestive of a combination of osteoarthritis and  pseudogout.        Electronically signed by: RG FORTE  06/19/20 07:50        Review of Systems   Constitutional: Negative.    HENT: Negative.     Eyes: Negative.    Respiratory: Negative.     Cardiovascular: Negative.    Gastrointestinal: Negative.    Endocrine: Negative.    Genitourinary: Negative.    Musculoskeletal:  Positive for arthralgias, back pain, gait problem, myalgias and neck pain. Negative for joint swelling and neck stiffness.   Skin: Negative.    Allergic/Immunologic: Negative.    Neurological:  Positive for headaches. Negative for dizziness, tremors, seizures, syncope, facial asymmetry, speech difficulty, weakness, light-headedness and numbness.   Hematological: Negative.    Psychiatric/Behavioral: Negative.         Objective   Physical Exam  Vitals and nursing note reviewed.   Constitutional:       Appearance: Normal appearance.   HENT:      Head: Normocephalic  and atraumatic.   Eyes:      Conjunctiva/sclera: Conjunctivae normal.   Cardiovascular:      Pulses: Normal pulses.   Pulmonary:      Effort: Pulmonary effort is normal. No respiratory distress.   Musculoskeletal:      Right lower leg: No edema.      Left lower leg: No edema.   Skin:     General: Skin is warm and dry.      Capillary Refill: Capillary refill takes 2 to 3 seconds.   Neurological:      Mental Status: She is alert and oriented to person, place, and time.      Cranial Nerves: No cranial nerve deficit.      Sensory: Sensory deficit present.      Motor: No weakness.      Gait: Gait abnormal.      Comments: Altered sensation to right ulnar side of forearm.      Positive Romberg.   Psychiatric:         Behavior: Behavior normal.         Marylou was seen today for med refill and pain.  Diagnoses and all orders for this visit:  Encounter for long-term use of opiate analgesic  Cervical spondylosis without myelopathy  Cervical stenosis of spine  Postlaminectomy syndrome, cervical region     Follow-up 12 weeks.    Reviewed and approved by SURINDER DE LA FUENTE on 9/19/24 at 9:17 AM.

## 2024-09-25 ENCOUNTER — APPOINTMENT (OUTPATIENT)
Dept: PAIN MEDICINE | Facility: CLINIC | Age: 74
End: 2024-09-25
Payer: MEDICARE

## 2024-10-21 DIAGNOSIS — D32.9 MENINGIOMA (MULTI): ICD-10-CM

## 2024-10-24 ENCOUNTER — APPOINTMENT (OUTPATIENT)
Dept: NEUROSURGERY | Facility: CLINIC | Age: 74
End: 2024-10-24
Payer: MEDICARE

## 2024-12-06 ENCOUNTER — OFFICE VISIT (OUTPATIENT)
Dept: PAIN MEDICINE | Facility: CLINIC | Age: 74
End: 2024-12-06
Payer: MEDICARE

## 2024-12-06 VITALS
SYSTOLIC BLOOD PRESSURE: 138 MMHG | OXYGEN SATURATION: 93 % | DIASTOLIC BLOOD PRESSURE: 85 MMHG | RESPIRATION RATE: 16 BRPM | HEART RATE: 80 BPM

## 2024-12-06 DIAGNOSIS — M48.02 CERVICAL STENOSIS OF SPINE: ICD-10-CM

## 2024-12-06 DIAGNOSIS — M96.1 POSTLAMINECTOMY SYNDROME, CERVICAL REGION: Primary | ICD-10-CM

## 2024-12-06 DIAGNOSIS — M47.812 CERVICAL SPONDYLOSIS WITHOUT MYELOPATHY: ICD-10-CM

## 2024-12-06 DIAGNOSIS — G56.21 ULNAR NERVE COMPRESSION AT MULTIPLE LEVELS, RIGHT: ICD-10-CM

## 2024-12-06 PROCEDURE — 1159F MED LIST DOCD IN RCRD: CPT | Performed by: NURSE PRACTITIONER

## 2024-12-06 PROCEDURE — 3075F SYST BP GE 130 - 139MM HG: CPT | Performed by: NURSE PRACTITIONER

## 2024-12-06 PROCEDURE — 99213 OFFICE O/P EST LOW 20 MIN: CPT | Performed by: NURSE PRACTITIONER

## 2024-12-06 PROCEDURE — 1160F RVW MEDS BY RX/DR IN RCRD: CPT | Performed by: NURSE PRACTITIONER

## 2024-12-06 PROCEDURE — 1125F AMNT PAIN NOTED PAIN PRSNT: CPT | Performed by: NURSE PRACTITIONER

## 2024-12-06 PROCEDURE — 1036F TOBACCO NON-USER: CPT | Performed by: NURSE PRACTITIONER

## 2024-12-06 PROCEDURE — 3079F DIAST BP 80-89 MM HG: CPT | Performed by: NURSE PRACTITIONER

## 2024-12-06 RX ORDER — HYDROCODONE BITARTRATE AND ACETAMINOPHEN 10; 325 MG/1; MG/1
1 TABLET ORAL 4 TIMES DAILY PRN
Qty: 112 TABLET | Refills: 0 | Status: SHIPPED | OUTPATIENT
Start: 2025-01-11 | End: 2025-02-08

## 2024-12-06 RX ORDER — HYDROCODONE BITARTRATE AND ACETAMINOPHEN 10; 325 MG/1; MG/1
1 TABLET ORAL 4 TIMES DAILY PRN
Qty: 112 TABLET | Refills: 0 | Status: SHIPPED | OUTPATIENT
Start: 2024-12-14 | End: 2025-01-11

## 2024-12-06 RX ORDER — PREDNISONE 20 MG/1
20 TABLET ORAL 2 TIMES DAILY
Qty: 10 TABLET | Refills: 0 | Status: SHIPPED | OUTPATIENT
Start: 2024-12-06 | End: 2024-12-11

## 2024-12-06 RX ORDER — GABAPENTIN 600 MG/1
600 TABLET ORAL 3 TIMES DAILY
Qty: 300 TABLET | Refills: 2 | Status: SHIPPED | OUTPATIENT
Start: 2024-12-22

## 2024-12-06 RX ORDER — HYDROCODONE BITARTRATE AND ACETAMINOPHEN 10; 325 MG/1; MG/1
1 TABLET ORAL 4 TIMES DAILY PRN
Qty: 112 TABLET | Refills: 0 | Status: SHIPPED | OUTPATIENT
Start: 2025-02-08 | End: 2025-03-08

## 2024-12-06 RX ORDER — LIDOCAINE AND PRILOCAINE 25; 25 MG/G; MG/G
CREAM TOPICAL
Qty: 30 G | Refills: 2 | Status: SHIPPED | OUTPATIENT
Start: 2024-12-06

## 2024-12-06 ASSESSMENT — ENCOUNTER SYMPTOMS
ENDOCRINE NEGATIVE: 1
TREMORS: 0
EYES NEGATIVE: 1
ARTHRALGIAS: 1
HEMATOLOGIC/LYMPHATIC NEGATIVE: 1
ALLERGIC/IMMUNOLOGIC NEGATIVE: 1
PAIN: 1
MYALGIAS: 1
BACK PAIN: 1
SPEECH DIFFICULTY: 0
JOINT SWELLING: 0
NECK PAIN: 1
DIZZINESS: 0
WEAKNESS: 1
PSYCHIATRIC NEGATIVE: 1
LIGHT-HEADEDNESS: 0
SEIZURES: 0
GASTROINTESTINAL NEGATIVE: 1
NECK STIFFNESS: 0
RESPIRATORY NEGATIVE: 1
CONSTITUTIONAL NEGATIVE: 1
FACIAL ASYMMETRY: 0
HEADACHES: 1
NUMBNESS: 1
CARDIOVASCULAR NEGATIVE: 1

## 2024-12-06 ASSESSMENT — PAIN SCALES - GENERAL: PAINLEVEL_OUTOF10: 6

## 2024-12-06 ASSESSMENT — PAIN DESCRIPTION - DESCRIPTORS: DESCRIPTORS: SHARP

## 2024-12-06 ASSESSMENT — PAIN - FUNCTIONAL ASSESSMENT: PAIN_FUNCTIONAL_ASSESSMENT: 0-10

## 2024-12-06 NOTE — PROGRESS NOTES
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 arthritic changes left knee worst medially with  chondrocalcinosis suggestive of a combination of osteoarthritis and  pseudogout.        Electronically signed by: RG FORTE  06/19/20 07:50        Review of Systems   Constitutional: Negative.    HENT: Negative.     Eyes: Negative.    Respiratory: Negative.     Cardiovascular: Negative.    Gastrointestinal: Negative.    Endocrine: Negative.    Genitourinary: Negative.    Musculoskeletal:  Positive for arthralgias, back pain, gait problem, myalgias and neck pain. Negative for joint swelling and neck stiffness.   Skin: Negative.    Allergic/Immunologic: Negative.    Neurological:  Positive for weakness, numbness and headaches. Negative for dizziness, tremors, seizures, syncope, facial asymmetry, speech difficulty and light-headedness.   Hematological: Negative.    Psychiatric/Behavioral: Negative.         Objective   Physical Exam  Vitals and nursing note reviewed.   Constitutional:       Appearance: Normal appearance.   HENT:      Head: Normocephalic and atraumatic.   Eyes:      Conjunctiva/sclera: Conjunctivae normal.   Cardiovascular:      Pulses: Normal pulses.   Pulmonary:      Effort: Pulmonary effort is normal. No  respiratory distress.   Musculoskeletal:      Right lower leg: No edema.      Left lower leg: No edema.   Skin:     General: Skin is warm and dry.      Capillary Refill: Capillary refill takes 2 to 3 seconds.   Neurological:      Mental Status: She is alert and oriented to person, place, and time.      Cranial Nerves: No cranial nerve deficit.      Sensory: Sensory deficit present.      Motor: No weakness.      Gait: Gait abnormal.      Comments: Altered sensation to right ulnar side of forearm.      Positive Romberg.   Psychiatric:         Behavior: Behavior normal.     Marylou was seen today for neck pain.  Diagnoses and all orders for this visit:  Postlaminectomy syndrome, cervical region (Primary)  -     gabapentin (Neurontin) 600 mg tablet; Take 1 tablet (600 mg) by mouth 3 times a day. Do not fill before December 22, 2024.  -     HYDROcodone-acetaminophen (Norco)  mg tablet; Take 1 tablet by mouth 4 times a day as needed for severe pain (7 - 10) for up to 28 days. Do not fill before February 8, 2025.  -     HYDROcodone-acetaminophen (Norco)  mg tablet; Take 1 tablet by mouth 4 times a day as needed for severe pain (7 - 10) for up to 28 days. Do not fill before January 11, 2025.  Cervical spondylosis without myelopathy  -     gabapentin (Neurontin) 600 mg tablet; Take 1 tablet (600 mg) by mouth 3 times a day. Do not fill before December 22, 2024.  -     HYDROcodone-acetaminophen (Norco)  mg tablet; Take 1 tablet by mouth 4 times a day as needed for severe pain (7 - 10) for up to 28 days. Do not fill before February 8, 2025.  -     HYDROcodone-acetaminophen (Norco)  mg tablet; Take 1 tablet by mouth 4 times a day as needed for severe pain (7 - 10) for up to 28 days. Do not fill before January 11, 2025.  -     HYDROcodone-acetaminophen (Norco)  mg tablet; Take 1 tablet by mouth 4 times a day as needed for severe pain (7 - 10) for up to 28 days. Do not fill before December 14,  2024.  Cervical stenosis of spine  -     gabapentin (Neurontin) 600 mg tablet; Take 1 tablet (600 mg) by mouth 3 times a day. Do not fill before December 22, 2024.  -     HYDROcodone-acetaminophen (Norco)  mg tablet; Take 1 tablet by mouth 4 times a day as needed for severe pain (7 - 10) for up to 28 days. Do not fill before February 8, 2025.  -     HYDROcodone-acetaminophen (Norco)  mg tablet; Take 1 tablet by mouth 4 times a day as needed for severe pain (7 - 10) for up to 28 days. Do not fill before January 11, 2025.  -     HYDROcodone-acetaminophen (Norco)  mg tablet; Take 1 tablet by mouth 4 times a day as needed for severe pain (7 - 10) for up to 28 days. Do not fill before December 14, 2024.  Ulnar nerve compression at multiple levels, right  -     lidocaine-prilocaine (Emla) 2.5-2.5 % cream; Apply to area of treatment 1 hour prior to procedure  -     predniSONE (Deltasone) 20 mg tablet; Take 1 tablet (20 mg) by mouth 2 times a day for 5 days.        Follow-up 12 weeks.    Reviewed and approved by SURINDER DE LA FUENTE on 12/6/24 at 9:42 AM.

## 2024-12-13 ENCOUNTER — OFFICE VISIT (OUTPATIENT)
Dept: ORTHOPEDIC SURGERY | Facility: CLINIC | Age: 74
End: 2024-12-13
Payer: MEDICARE

## 2024-12-13 DIAGNOSIS — M17.11 ARTHRITIS OF RIGHT KNEE: ICD-10-CM

## 2024-12-13 DIAGNOSIS — M17.12 ARTHRITIS OF LEFT KNEE: Primary | ICD-10-CM

## 2024-12-13 PROCEDURE — 1036F TOBACCO NON-USER: CPT

## 2024-12-13 PROCEDURE — 99214 OFFICE O/P EST MOD 30 MIN: CPT

## 2024-12-13 PROCEDURE — 1160F RVW MEDS BY RX/DR IN RCRD: CPT

## 2024-12-13 PROCEDURE — 20610 DRAIN/INJ JOINT/BURSA W/O US: CPT

## 2024-12-13 PROCEDURE — 1159F MED LIST DOCD IN RCRD: CPT

## 2024-12-13 PROCEDURE — 1125F AMNT PAIN NOTED PAIN PRSNT: CPT

## 2024-12-13 RX ORDER — TRIAMCINOLONE ACETONIDE 40 MG/ML
2.5 INJECTION, SUSPENSION INTRA-ARTICULAR; INTRAMUSCULAR
Status: COMPLETED | OUTPATIENT
Start: 2024-12-13 | End: 2024-12-13

## 2024-12-13 ASSESSMENT — PAIN SCALES - GENERAL: PAINLEVEL_OUTOF10: 6

## 2024-12-13 ASSESSMENT — PAIN - FUNCTIONAL ASSESSMENT: PAIN_FUNCTIONAL_ASSESSMENT: 0-10

## 2024-12-13 NOTE — PROGRESS NOTES
This is a consultation from Dr. Yue Santana DO for   Chief Complaint   Patient presents with    Right Knee - Pain    Left Knee - Pain       This is a 74 y.o. female who presents for evaluation of bilateral knee pain.  Patient states that she has had knee pain for a long time and was treated with steroid injection back in June which is lasted for a very long time.  She states that they recently worn off and experiences more pain with long periods of walking and going up and down stairs.  She would like another round of steroid injection since it worked so well the last time.  Patient states that she also needs some help with strengthening and balance.  She uses a cane to walk around when the pain comes back and balance is affected.  Patient denies any numbness or tingling or distal extremities.    Physical Exam    There has been no interval change in this patient's past medical, surgical, medications, allergies, family history or social history since the most recent visit to a provider within our department. 14 point review of systems was performed, reviewed, and negative except for pertinent positives documented in the history of present illness.     Constitutional: well developed, well nourished female in no acute distress  Psychiatric: normal mood, appropriate affect  Eyes: sclera anicteric  HENT: normocephalic/atraumatic  CV: regular rate and rhythm   Respiratory: non labored breathing  Integumentary: no rash  Neurological: moves all extremities    Bilateral knee exam: skin intact no lacerations or abrations. no effusion.  Tender medial joint line. negative log roll negative patellar grind. ROM 0-120. stable to varus and valgus stress at 0 and 30 degrees. negative lachman negative posterior drawer negative phoebe. 5/5 ehl/fhl/gs/ta. silt s/s/sp/dp/t. 2+ dp/pt        L Inj/Asp: bilateral knee on 12/13/2024 1:10 PM  Indications: pain and joint swelling  Details: 22 G needle, anterolateral  approach  Medications (Right): 2.5 mg triamcinolone acetonide 40 mg/mL  Medications (Left): 2.5 mg triamcinolone acetonide 40 mg/mL    Discussion:  I discussed the conservative treatment options for knee osteoarthritis including but not limited to physical therapy, oral NSAIDS, activity and lifestyle modification, and corticosteroid injections. Pt has elected to undergo a cortisone injection today. I have explained the risk and benefits of an injection including the possibility of joint infection, bleeding, damage to cartilage, allergic reaction. Patient verbalized understanding and gave verbal consent wishes to proceed with a intra-articular cortisone injection for their knee.    Procedure:  After discussing the risk and benefits of the procedure, we proceeded with an intra-articular bilateral knee injection. We discussed the risks and benefits and potential morbidity related to the treatment, and to the prescription medication administered in the injection    With the patient's informed verbal consent, the bilateral knees were prepped in standard sterile fashion with Chlorhexidine. The skin was then anesthetized with ethyl chloride spray and cleaned again with Chlorhexidine. The bilateral knees were then apirated/injected with a prefilled 20-gauge syringe of 40 mg Kenalog + 4 ml Lidocaine using the lateral approach without complications.  The patient tolerated this well and felt immediate initial relief of symptoms. A bandaid was applied and the patient ambulated out of the clinic on ther own accord without difficulty. Patient was instructed to avoid physical activity for 24-48 hours to prevent the knees from swelling and may ice the knees as tolerated. Patient should contact the office if any signs of of infection appear: redness, fever, chills, drainage, swelling or warmth to the knees.  Pt understands that the injections can be repeated no sooner than 3 months.      Procedure, treatment alternatives, risks and  "benefits explained, specific risks discussed. Consent was given by the patient. Immediately prior to procedure a time out was called to verify the correct patient, procedure, equipment, support staff and site/side marked as required. Patient was prepped and draped in the usual sterile fashion.             Impression/Plan: This is a 74 y.o. female with recent exacerbation of bilateral knee pain.  I had an in depth discussion with the patient regarding treatment options for arthritis and their relative risks and benefits. We reviewed surgical and nonsurgical option for treatment. Treatments include anti inflammatory medications, physical therapy, weight loss, activity modification, use of assistive devices, injection therapies. We discussed current prescriptions and risks and benefits of continuation of prescription medication as apporpriate. We discussed that arthritis is often progressive over time, an in end stage arthritis surgical interventions can be considered, including arthroplasty. All questions were answered and the patient voiced their understanding.  We did bilateral steroid injection today and I also placed an order for physical therapy for her to work on strengthening and balance.    BMI Readings from Last 1 Encounters:   08/30/24 36.02 kg/m²      Lab Results   Component Value Date    CREATININE 0.70 01/07/2022     Tobacco Use: Medium Risk (12/13/2024)    Patient History     Smoking Tobacco Use: Former     Smokeless Tobacco Use: Never     Passive Exposure: Not on file      Computed MELD 3.0 unavailable. One or more values for this score either were not found within the given timeframe or did not fit some other criterion.  Computed MELD-Na unavailable. One or more values for this score either were not found within the given timeframe or did not fit some other criterion.       Lab Results   Component Value Date    HGBA1C 8.2 (A) 06/13/2023     No results found for: \"STAPHMRSASCR\"  "

## 2024-12-16 ENCOUNTER — APPOINTMENT (OUTPATIENT)
Dept: ORTHOPEDIC SURGERY | Facility: CLINIC | Age: 74
End: 2024-12-16
Payer: MEDICARE

## 2025-01-06 ENCOUNTER — APPOINTMENT (OUTPATIENT)
Dept: ORTHOPEDIC SURGERY | Facility: CLINIC | Age: 75
End: 2025-01-06
Payer: COMMERCIAL

## 2025-01-06 DIAGNOSIS — G56.21 ULNAR NEUROPATHY AT ELBOW OF RIGHT UPPER EXTREMITY: Primary | ICD-10-CM

## 2025-01-06 PROCEDURE — 99213 OFFICE O/P EST LOW 20 MIN: CPT | Performed by: ORTHOPAEDIC SURGERY

## 2025-01-06 PROCEDURE — 1125F AMNT PAIN NOTED PAIN PRSNT: CPT | Performed by: ORTHOPAEDIC SURGERY

## 2025-01-06 PROCEDURE — 1036F TOBACCO NON-USER: CPT | Performed by: ORTHOPAEDIC SURGERY

## 2025-01-06 PROCEDURE — 1159F MED LIST DOCD IN RCRD: CPT | Performed by: ORTHOPAEDIC SURGERY

## 2025-01-06 ASSESSMENT — PAIN - FUNCTIONAL ASSESSMENT: PAIN_FUNCTIONAL_ASSESSMENT: 0-10

## 2025-01-06 ASSESSMENT — PAIN SCALES - GENERAL: PAINLEVEL_OUTOF10: 8

## 2025-01-07 NOTE — PROGRESS NOTES
History of Present Illness:  Chief Complaint   Patient presents with    Right Elbow - Follow-up     right ulnar neuropathy in setting of underlying polyneuropathy       74-year-old female presents today with her daughter for repeat evaluation of right elbow ulnar neuropathy.  She reports continued numbness and tingling into the small and ring fingers as well as a burning sensation into the tips of all of her fingers.  The radial 3 digits do not seem to have any numbness.  She has continued to try and avoid prolonged elbow flexion, but reports that the numbness into her small and ring finger seems to be more intense.      Past Medical History:   Diagnosis Date    Carpal tunnel syndrome, bilateral upper limbs 04/11/2018    Bilateral carpal tunnel syndrome    Personal history of other diseases of the nervous system and sense organs 12/03/2018    History of peripheral neuropathy    Radiculopathy, cervical region 04/11/2018    Cervical radiculopathy at C5    Radiculopathy, cervical region 04/11/2018    Cervical radiculopathy at C6    Radiculopathy, cervical region 04/11/2018    Cervical radiculopathy at C7       Medication Documentation Review Audit       Reviewed by Racheal Irene CMA (Medical Assistant) on 01/06/25 at 0926      Medication Order Taking? Sig Documenting Provider Last Dose Status   amLODIPine (Norvasc) 2.5 mg tablet [id number] No Take 1 tablet (2.5 mg) by mouth once daily. Historical Provider, MD Taking Active   amLODIPine (Norvasc) 5 mg tablet [id number] No Take by mouth. Historical Provider, MD Taking Active   atenolol (Tenormin) 25 mg tablet [id number] No Take 1 tablet (25 mg) by mouth once daily. Historical MD Endy Taking Active   cholecalciferol (Vitamin D-3) 50 MCG (2000 UT) tablet [id number] No Take 1 tablet (2,000 Units) by mouth once daily. Historical Provider, MD Taking Active   citalopram (CeleXA) 10 mg tablet [id number] No Take 1 tablet (10 mg) by mouth once daily. Historical MD Endy Taking  Active   gabapentin (Neurontin) 600 mg tablet [id number]  Take 1 tablet (600 mg) by mouth 3 times a day. Do not fill before December 22, 2024. SCOTT Agrawal  Active   glipiZIDE XL (Glucotrol XL) 10 mg 24 hr tablet [id number] No Take 1 tablet (10 mg) by mouth 2 times a day. Historical Provider, MD Taking Active   hydroCHLOROthiazide (Microzide) 12.5 mg capsule [id number] No Take by mouth. Historical Provider, MD Taking Active   HYDROcodone-acetaminophen (Norco)  mg tablet [id number]  Take 1 tablet by mouth 4 times a day as needed for severe pain (7 - 10) for up to 28 days. Do not fill before February 8, 2025. LEANDRO Agrawal-CNP  Active   HYDROcodone-acetaminophen (Norco)  mg tablet [id number]  Take 1 tablet by mouth 4 times a day as needed for severe pain (7 - 10) for up to 28 days. Do not fill before January 11, 2025. LEANDRO Agrawal-CNP  Active   HYDROcodone-acetaminophen (Norco)  mg tablet [id number]  Take 1 tablet by mouth 4 times a day as needed for severe pain (7 - 10) for up to 28 days. Do not fill before December 14, 2024. SCOTT Agrawal  Active   levothyroxine (Synthroid, Levoxyl) 50 mcg tablet [id number] No Take 1 tablet (50 mcg) by mouth once daily. Historical Provider, MD Taking Active   lidocaine-prilocaine (Emla) 2.5-2.5 % cream [id number]  Apply to area of treatment 1 hour prior to procedure SCOTT Agrawal  Active   metFORMIN (Glucophage) 500 mg tablet [id number] No Take 1 tablet (500 mg) by mouth once daily. Historical Provider, MD Taking Active   naloxone (Narcan) 4 mg/0.1 mL nasal spray [id number]  Administer 1 spray (4 mg) into affected nostril(s) if needed for opioid reversal or respiratory depression. SCOTT Agrawal  Active   risedronate (Actonel) 150 mg tablet [id number] No Take 1 tablet (150 mg) by mouth every 30 (thirty) days.  take with 8 oz of water, first thing in AM, do not take any other meds or food for 1/2 hr. Stay upright  Historical Provider, MD Taking Active   semaglutide (Rybelsus) 7 mg tablet [id number] No Take by mouth. Historical Provider, MD Taking Active                    Allergies   Allergen Reactions    Heparin Unknown    Versed [Midazolam] Confusion       Social History     Socioeconomic History    Marital status:      Spouse name: Not on file    Number of children: Not on file    Years of education: Not on file    Highest education level: Not on file   Occupational History    Not on file   Tobacco Use    Smoking status: Former     Current packs/day: 0.00     Types: Cigarettes     Start date:      Quit date:      Years since quittin.0    Smokeless tobacco: Never   Substance and Sexual Activity    Alcohol use: Not on file    Drug use: Not on file    Sexual activity: Not on file   Other Topics Concern    Not on file   Social History Narrative    Not on file     Social Drivers of Health     Financial Resource Strain: Not on file   Food Insecurity: Not on file   Transportation Needs: Not on file   Physical Activity: Not on file   Stress: Not on file   Social Connections: Not on file   Intimate Partner Violence: Not on file   Housing Stability: Not on file       Past Surgical History:   Procedure Laterality Date    BREAST SURGERY  2018    Breast Surgery Reduction Procedure    HYSTERECTOMY  2018    Hysterectomy    KNEE SURGERY  2018    Knee Surgery    NECK SURGERY  2018    Neck Surgery    STOMACH SURGERY  2018    Gastric Surgery        Review of Systems   GENERAL: Negative for malaise, significant weight loss, fever  MUSCULOSKELETAL: see HPI  NEURO: See HPI    Constitutional: Appears well-developed and well-nourished.  Head: Normocephalic and atraumatic.  Eyes: EOMI grossly  Cardiovascular: Intact distal pulses.   Respiratory: Effort normal. No respiratory distress.  Neurologic: Alert and oriented to person, place, and time.  Skin: Skin is warm and dry.  Hematologic / Lymphatic: No  lymphedema, lymphangitis.  Psychiatric: normal mood and affect. Behavior is normal.   Musculoskeletal:  Right hand/arm: Skin intact.  Normal rugal patterns.  Elbow with good range of motion.  Reported tenderness about ulnar nerve at level of cubital tunnel.  Positive elbow flexion test.  Positive Tinel's at level of cubital tunnel.  Diminished sensation subjectively into tips of digits that is more pronounced in the small and ring fingers.  Greater than 8 mm static 2-point discrimination into small finger.  6 mm static 2-point discrimination thumb.  5/5 finger abduction.  4+/5 thumb abduction.  Negative Wartenberg's.  Negative Durkan's/Phalen's testing.  Negative Tinel's about carpal tunnel.  No appreciable thenar or intrinsic wasting.      Assessment:  Patient with right ulnar neuropathy in setting of underlying polyneuropathy.  Worsening symptoms despite conservative treatment with avoidance of elbow flexion/pressure on medial elbow.    Plan:  We once again reviewed risks and benefits of continued nonoperative versus operative treatment options.  She does seem to have some clinical worsening of her symptoms despite conservative treatment.  We discussed that further worsening could potentially cause additional permanent nerve dysfunction.  Patient prefers to continue with conservative treatment with avoidance of prolonged elbow flexion/pressure on the medial elbow at this time.  If she does reconsider she will call the office for scheduling.  We specifically discussed that the goal of any intervention would be to prevent further worsening, but that there are no guarantees.

## 2025-01-13 ENCOUNTER — OFFICE VISIT (OUTPATIENT)
Dept: ORTHOPEDIC SURGERY | Facility: CLINIC | Age: 75
End: 2025-01-13
Payer: COMMERCIAL

## 2025-01-13 DIAGNOSIS — S52.501A NONDISPLACED FRACTURE OF DISTAL END OF RIGHT RADIUS: Primary | ICD-10-CM

## 2025-01-13 PROCEDURE — 99213 OFFICE O/P EST LOW 20 MIN: CPT

## 2025-01-13 PROCEDURE — 1125F AMNT PAIN NOTED PAIN PRSNT: CPT

## 2025-01-13 PROCEDURE — 1160F RVW MEDS BY RX/DR IN RCRD: CPT

## 2025-01-13 PROCEDURE — 1036F TOBACCO NON-USER: CPT

## 2025-01-13 PROCEDURE — 1159F MED LIST DOCD IN RCRD: CPT

## 2025-01-13 ASSESSMENT — PAIN - FUNCTIONAL ASSESSMENT: PAIN_FUNCTIONAL_ASSESSMENT: 0-10

## 2025-01-13 ASSESSMENT — PAIN SCALES - GENERAL: PAINLEVEL_OUTOF10: 6

## 2025-01-13 NOTE — PROGRESS NOTES
HPI  Marylou Blas is a 74 y.o. female  in office today for   Chief Complaint   Patient presents with    Right Wrist - Injury     Pt fell Saturday night-Was seen in  yesterday-Has been wearing a brace.    .  she continues to have pain, sees pain management and taking pain medication she has from them for pain which is only marginally helping.  She has been icing and trying to elevate, but is suppose to keep her elbow straight for treatment of ulnar neuropathy.  She arrived wearing a thumb spica brace, but brace was placed incorrectly on her wrist.    Past Medical History: DM, HTN, hypothyroid    Medication  Current Outpatient Medications on File Prior to Visit   Medication Sig Dispense Refill    amLODIPine (Norvasc) 2.5 mg tablet Take 1 tablet (2.5 mg) by mouth once daily.      amLODIPine (Norvasc) 5 mg tablet Take by mouth.      atenolol (Tenormin) 25 mg tablet Take 1 tablet (25 mg) by mouth once daily.      cholecalciferol (Vitamin D-3) 50 MCG (2000 UT) tablet Take 1 tablet (2,000 Units) by mouth once daily.      citalopram (CeleXA) 10 mg tablet Take 1 tablet (10 mg) by mouth once daily.      gabapentin (Neurontin) 600 mg tablet Take 1 tablet (600 mg) by mouth 3 times a day. Do not fill before December 22, 2024. 300 tablet 2    glipiZIDE XL (Glucotrol XL) 10 mg 24 hr tablet Take 1 tablet (10 mg) by mouth 2 times a day.      hydroCHLOROthiazide (Microzide) 12.5 mg capsule Take by mouth.      [START ON 2/8/2025] HYDROcodone-acetaminophen (Norco)  mg tablet Take 1 tablet by mouth 4 times a day as needed for severe pain (7 - 10) for up to 28 days. Do not fill before February 8, 2025. 112 tablet 0    HYDROcodone-acetaminophen (Norco)  mg tablet Take 1 tablet by mouth 4 times a day as needed for severe pain (7 - 10) for up to 28 days. Do not fill before January 11, 2025. 112 tablet 0    HYDROcodone-acetaminophen (Norco)  mg tablet Take 1 tablet by mouth 4 times a day as needed for severe pain (7 -  10) for up to 28 days. Do not fill before December 14, 2024. 112 tablet 0    levothyroxine (Synthroid, Levoxyl) 50 mcg tablet Take 1 tablet (50 mcg) by mouth once daily.      lidocaine-prilocaine (Emla) 2.5-2.5 % cream Apply to area of treatment 1 hour prior to procedure 30 g 2    metFORMIN (Glucophage) 500 mg tablet Take 1 tablet (500 mg) by mouth once daily.      naloxone (Narcan) 4 mg/0.1 mL nasal spray Administer 1 spray (4 mg) into affected nostril(s) if needed for opioid reversal or respiratory depression. 2 each 0    risedronate (Actonel) 150 mg tablet Take 1 tablet (150 mg) by mouth every 30 (thirty) days.  take with 8 oz of water, first thing in AM, do not take any other meds or food for 1/2 hr. Stay upright      semaglutide (Rybelsus) 7 mg tablet Take by mouth.       No current facility-administered medications on file prior to visit.       Physical Exam  Constitutional: well developed, well nourished female in no acute distress  Psychiatric: normal mood, appropriate affect  Eyes: sclera anicteric  HENT: normocephalic/atraumatic  CV: regular rate and rhythm   Respiratory: non labored breathing  Integumentary: no rash  Neurological: moves all extremities    Right Hand Exam     Tenderness   The patient is experiencing tenderness in the radial area.    Range of Motion   Wrist   Right wrist extension: purposely not assessed given fracture.     Other   Erythema: absent  Scars: absent  Sensation: normal    Comments:  No visible deformity, mild edema              Imaging/Lab:  X-rays were taken 1/11/24 at an outside urgent care which were reviewed by myself and report provided and is read by radiology and show unremarkable soft tissues, nondisplaced fracture through the distal radius.  Degenerative changes in the hand and wrist.  No radio opaque foreign body appreciated      Assessment  Assessment: right distal radius fracture    Plan  Plan:  History, physical exam, and imaging were reviewed with patient.  Discussed brace versus cast and she is electing to remain in brace.  I placed the brace on her wrist correctly and she stated if felt much better.  Brace to be worn full time other than for hygiene.  She should be NWB with right wrist.  RICE and pain medication as needed.  Follow Up: 4 weeks with new x-ray of wrist.    All questions were answered for the patient prior to end of exam and patient addressed their understanding.    Alejandrina Martinez PA-C  01/13/25

## 2025-01-27 ENCOUNTER — APPOINTMENT (OUTPATIENT)
Dept: ORTHOPEDIC SURGERY | Facility: CLINIC | Age: 75
End: 2025-01-27
Payer: COMMERCIAL

## 2025-02-17 ENCOUNTER — APPOINTMENT (OUTPATIENT)
Dept: ORTHOPEDIC SURGERY | Facility: CLINIC | Age: 75
End: 2025-02-17
Payer: COMMERCIAL

## 2025-02-20 ENCOUNTER — APPOINTMENT (OUTPATIENT)
Dept: GENERAL RADIOLOGY | Age: 75
DRG: 556 | End: 2025-02-20
Payer: MEDICARE

## 2025-02-20 ENCOUNTER — APPOINTMENT (OUTPATIENT)
Dept: CT IMAGING | Age: 75
DRG: 556 | End: 2025-02-20
Payer: MEDICARE

## 2025-02-20 ENCOUNTER — TELEPHONE (OUTPATIENT)
Dept: ORTHOPEDIC SURGERY | Facility: CLINIC | Age: 75
End: 2025-02-20
Payer: COMMERCIAL

## 2025-02-20 ENCOUNTER — HOSPITAL ENCOUNTER (INPATIENT)
Age: 75
LOS: 1 days | Discharge: NURSING, REHAB OR LONG-TERM CARE | DRG: 556 | End: 2025-02-21
Attending: EMERGENCY MEDICINE | Admitting: INTERNAL MEDICINE
Payer: MEDICARE

## 2025-02-20 DIAGNOSIS — S63.502A SPRAIN OF LEFT WRIST, INITIAL ENCOUNTER: ICD-10-CM

## 2025-02-20 DIAGNOSIS — S42.295A OTHER CLOSED NONDISPLACED FRACTURE OF PROXIMAL END OF LEFT HUMERUS, INITIAL ENCOUNTER: Primary | ICD-10-CM
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
## 2025-02-20 LAB
ALBUMIN SERPL-MCNC: 3.8 G/DL (ref 3.5–5.2)
ALP SERPL-CCNC: 131 U/L (ref 35–104)
ALT SERPL-CCNC: 13 U/L (ref 0–32)
ANION GAP SERPL CALCULATED.3IONS-SCNC: 12 MMOL/L (ref 7–16)
AST SERPL-CCNC: 16 U/L (ref 0–31)
BACTERIA URNS QL MICRO: ABNORMAL
BASOPHILS # BLD: 0.04 K/UL (ref 0–0.2)
BASOPHILS NFR BLD: 0 % (ref 0–2)
BILIRUB SERPL-MCNC: 0.9 MG/DL (ref 0–1.2)
BILIRUB UR QL STRIP: NEGATIVE
BUN SERPL-MCNC: 9 MG/DL (ref 6–23)
CALCIUM SERPL-MCNC: 9.8 MG/DL (ref 8.6–10.2)
CHLORIDE SERPL-SCNC: 94 MMOL/L (ref 98–107)
CK SERPL-CCNC: 71 U/L (ref 20–180)
CLARITY UR: CLEAR
CO2 SERPL-SCNC: 28 MMOL/L (ref 22–29)
COLOR UR: YELLOW
CREAT SERPL-MCNC: 0.6 MG/DL (ref 0.5–1)
EOSINOPHIL # BLD: 0.03 K/UL (ref 0.05–0.5)
EOSINOPHILS RELATIVE PERCENT: 0 % (ref 0–6)
EPI CELLS #/AREA URNS HPF: ABNORMAL /HPF
ERYTHROCYTE [DISTWIDTH] IN BLOOD BY AUTOMATED COUNT: 13.3 % (ref 11.5–15)
GFR, ESTIMATED: >90 ML/MIN/1.73M2
GLUCOSE BLD-MCNC: 239 MG/DL (ref 74–99)
GLUCOSE SERPL-MCNC: 236 MG/DL (ref 74–99)
GLUCOSE UR STRIP-MCNC: 250 MG/DL
HCT VFR BLD AUTO: 41.2 % (ref 34–48)
HGB BLD-MCNC: 14.6 G/DL (ref 11.5–15.5)
HGB UR QL STRIP.AUTO: NEGATIVE
IMM GRANULOCYTES # BLD AUTO: 0.03 K/UL (ref 0–0.58)
IMM GRANULOCYTES NFR BLD: 0 % (ref 0–5)
KETONES UR STRIP-MCNC: NEGATIVE MG/DL
LEUKOCYTE ESTERASE UR QL STRIP: NEGATIVE
LYMPHOCYTES NFR BLD: 1 K/UL (ref 1.5–4)
LYMPHOCYTES RELATIVE PERCENT: 8 % (ref 20–42)
MAGNESIUM SERPL-MCNC: 1.6 MG/DL (ref 1.6–2.6)
MCH RBC QN AUTO: 30.9 PG (ref 26–35)
MCHC RBC AUTO-ENTMCNC: 35.4 G/DL (ref 32–34.5)
MCV RBC AUTO: 87.1 FL (ref 80–99.9)
MONOCYTES NFR BLD: 0.98 K/UL (ref 0.1–0.95)
MONOCYTES NFR BLD: 8 % (ref 2–12)
NEUTROPHILS NFR BLD: 83 % (ref 43–80)
NEUTS SEG NFR BLD: 10.12 K/UL (ref 1.8–7.3)
NITRITE UR QL STRIP: POSITIVE
PH UR STRIP: 6.5 [PH] (ref 5–8)
PLATELET # BLD AUTO: 222 K/UL (ref 130–450)
PMV BLD AUTO: 10.1 FL (ref 7–12)
POTASSIUM SERPL-SCNC: 4.4 MMOL/L (ref 3.5–5)
PROT SERPL-MCNC: 6.7 G/DL (ref 6.4–8.3)
PROT UR STRIP-MCNC: NEGATIVE MG/DL
RBC # BLD AUTO: 4.73 M/UL (ref 3.5–5.5)
RBC #/AREA URNS HPF: ABNORMAL /HPF
SODIUM SERPL-SCNC: 134 MMOL/L (ref 132–146)
SP GR UR STRIP: 1.01 (ref 1–1.03)
TROPONIN I SERPL HS-MCNC: 10 NG/L (ref 0–9)
UROBILINOGEN UR STRIP-ACNC: 1 EU/DL (ref 0–1)
WBC #/AREA URNS HPF: ABNORMAL /HPF
WBC OTHER # BLD: 12.2 K/UL (ref 4.5–11.5)

## 2025-02-20 PROCEDURE — 70450 CT HEAD/BRAIN W/O DYE: CPT

## 2025-02-20 PROCEDURE — G0378 HOSPITAL OBSERVATION PER HR: HCPCS

## 2025-02-20 PROCEDURE — 2500000003 HC RX 250 WO HCPCS: Performed by: NURSE PRACTITIONER

## 2025-02-20 PROCEDURE — APPSS45 APP SPLIT SHARED TIME 31-45 MINUTES: Performed by: NURSE PRACTITIONER

## 2025-02-20 PROCEDURE — 87086 URINE CULTURE/COLONY COUNT: CPT

## 2025-02-20 PROCEDURE — 84484 ASSAY OF TROPONIN QUANT: CPT

## 2025-02-20 PROCEDURE — 1200000000 HC SEMI PRIVATE

## 2025-02-20 PROCEDURE — 80053 COMPREHEN METABOLIC PANEL: CPT

## 2025-02-20 PROCEDURE — 85025 COMPLETE CBC W/AUTO DIFF WBC: CPT

## 2025-02-20 PROCEDURE — 81001 URINALYSIS AUTO W/SCOPE: CPT

## 2025-02-20 PROCEDURE — 83735 ASSAY OF MAGNESIUM: CPT

## 2025-02-20 PROCEDURE — 73560 X-RAY EXAM OF KNEE 1 OR 2: CPT

## 2025-02-20 PROCEDURE — 96361 HYDRATE IV INFUSION ADD-ON: CPT

## 2025-02-20 PROCEDURE — 73110 X-RAY EXAM OF WRIST: CPT

## 2025-02-20 PROCEDURE — 93005 ELECTROCARDIOGRAM TRACING: CPT | Performed by: EMERGENCY MEDICINE

## 2025-02-20 PROCEDURE — 99285 EMERGENCY DEPT VISIT HI MDM: CPT

## 2025-02-20 PROCEDURE — 6370000000 HC RX 637 (ALT 250 FOR IP): Performed by: NURSE PRACTITIONER

## 2025-02-20 PROCEDURE — 82550 ASSAY OF CK (CPK): CPT

## 2025-02-20 PROCEDURE — 82962 GLUCOSE BLOOD TEST: CPT

## 2025-02-20 PROCEDURE — 73030 X-RAY EXAM OF SHOULDER: CPT

## 2025-02-20 PROCEDURE — 2580000003 HC RX 258: Performed by: EMERGENCY MEDICINE

## 2025-02-20 PROCEDURE — 99222 1ST HOSP IP/OBS MODERATE 55: CPT | Performed by: INTERNAL MEDICINE

## 2025-02-20 PROCEDURE — 96360 HYDRATION IV INFUSION INIT: CPT

## 2025-02-20 RX ORDER — DEXTROSE MONOHYDRATE 100 MG/ML
INJECTION, SOLUTION INTRAVENOUS CONTINUOUS PRN
Status: DISCONTINUED | OUTPATIENT
Start: 2025-02-20 | End: 2025-02-21 | Stop reason: HOSPADM

## 2025-02-20 RX ORDER — POTASSIUM CHLORIDE 7.45 MG/ML
10 INJECTION INTRAVENOUS PRN
Status: DISCONTINUED | OUTPATIENT
Start: 2025-02-20 | End: 2025-02-21 | Stop reason: HOSPADM

## 2025-02-20 RX ORDER — POTASSIUM CHLORIDE 1500 MG/1
40 TABLET, EXTENDED RELEASE ORAL PRN
Status: DISCONTINUED | OUTPATIENT
Start: 2025-02-20 | End: 2025-02-21 | Stop reason: HOSPADM

## 2025-02-20 RX ORDER — INSULIN LISPRO 100 [IU]/ML
0-4 INJECTION, SOLUTION INTRAVENOUS; SUBCUTANEOUS
Status: DISCONTINUED | OUTPATIENT
Start: 2025-02-20 | End: 2025-02-21 | Stop reason: HOSPADM

## 2025-02-20 RX ORDER — 0.9 % SODIUM CHLORIDE 0.9 %
1000 INTRAVENOUS SOLUTION INTRAVENOUS ONCE
Status: COMPLETED | OUTPATIENT
Start: 2025-02-20 | End: 2025-02-20

## 2025-02-20 RX ORDER — GABAPENTIN 300 MG/1
600 CAPSULE ORAL 3 TIMES DAILY
Status: DISCONTINUED | OUTPATIENT
Start: 2025-02-20 | End: 2025-02-21 | Stop reason: HOSPADM

## 2025-02-20 RX ORDER — ACETAMINOPHEN 650 MG/1
650 SUPPOSITORY RECTAL EVERY 6 HOURS PRN
Status: DISCONTINUED | OUTPATIENT
Start: 2025-02-20 | End: 2025-02-21 | Stop reason: HOSPADM

## 2025-02-20 RX ORDER — SODIUM CHLORIDE 0.9 % (FLUSH) 0.9 %
5-40 SYRINGE (ML) INJECTION PRN
Status: DISCONTINUED | OUTPATIENT
Start: 2025-02-20 | End: 2025-02-21 | Stop reason: HOSPADM

## 2025-02-20 RX ORDER — ACETAMINOPHEN 325 MG/1
650 TABLET ORAL EVERY 6 HOURS PRN
Status: DISCONTINUED | OUTPATIENT
Start: 2025-02-20 | End: 2025-02-21 | Stop reason: HOSPADM

## 2025-02-20 RX ORDER — AMLODIPINE BESYLATE 2.5 MG/1
2.5 TABLET ORAL DAILY
Status: DISCONTINUED | OUTPATIENT
Start: 2025-02-20 | End: 2025-02-21 | Stop reason: HOSPADM

## 2025-02-20 RX ORDER — LEVOTHYROXINE SODIUM 50 UG/1
50 TABLET ORAL DAILY
Status: DISCONTINUED | OUTPATIENT
Start: 2025-02-20 | End: 2025-02-21 | Stop reason: HOSPADM

## 2025-02-20 RX ORDER — ONDANSETRON 2 MG/ML
4 INJECTION INTRAMUSCULAR; INTRAVENOUS EVERY 6 HOURS PRN
Status: DISCONTINUED | OUTPATIENT
Start: 2025-02-20 | End: 2025-02-21 | Stop reason: HOSPADM

## 2025-02-20 RX ORDER — CITALOPRAM HYDROBROMIDE 20 MG/1
20 TABLET ORAL DAILY
Status: DISCONTINUED | OUTPATIENT
Start: 2025-02-20 | End: 2025-02-21 | Stop reason: HOSPADM

## 2025-02-20 RX ORDER — POLYETHYLENE GLYCOL 3350 17 G/17G
17 POWDER, FOR SOLUTION ORAL DAILY PRN
Status: DISCONTINUED | OUTPATIENT
Start: 2025-02-20 | End: 2025-02-21 | Stop reason: HOSPADM

## 2025-02-20 RX ORDER — SODIUM CHLORIDE 0.9 % (FLUSH) 0.9 %
5-40 SYRINGE (ML) INJECTION EVERY 12 HOURS SCHEDULED
Status: DISCONTINUED | OUTPATIENT
Start: 2025-02-20 | End: 2025-02-21 | Stop reason: HOSPADM

## 2025-02-20 RX ORDER — MAGNESIUM SULFATE IN WATER 40 MG/ML
2000 INJECTION, SOLUTION INTRAVENOUS PRN
Status: DISCONTINUED | OUTPATIENT
Start: 2025-02-20 | End: 2025-02-21 | Stop reason: HOSPADM

## 2025-02-20 RX ORDER — GLUCAGON 1 MG/ML
1 KIT INJECTION PRN
Status: DISCONTINUED | OUTPATIENT
Start: 2025-02-20 | End: 2025-02-21 | Stop reason: HOSPADM

## 2025-02-20 RX ORDER — ONDANSETRON 4 MG/1
4 TABLET, ORALLY DISINTEGRATING ORAL EVERY 8 HOURS PRN
Status: DISCONTINUED | OUTPATIENT
Start: 2025-02-20 | End: 2025-02-21 | Stop reason: HOSPADM

## 2025-02-20 RX ORDER — SODIUM CHLORIDE 9 MG/ML
INJECTION, SOLUTION INTRAVENOUS PRN
Status: DISCONTINUED | OUTPATIENT
Start: 2025-02-20 | End: 2025-02-21 | Stop reason: HOSPADM

## 2025-02-20 RX ORDER — ATENOLOL 25 MG/1
25 TABLET ORAL DAILY
Status: DISCONTINUED | OUTPATIENT
Start: 2025-02-20 | End: 2025-02-21 | Stop reason: HOSPADM

## 2025-02-20 RX ORDER — HYDROCODONE BITARTRATE AND ACETAMINOPHEN 10; 325 MG/1; MG/1
1 TABLET ORAL EVERY 6 HOURS PRN
Status: DISCONTINUED | OUTPATIENT
Start: 2025-02-20 | End: 2025-02-21 | Stop reason: HOSPADM

## 2025-02-20 RX ADMIN — INSULIN LISPRO 2 UNITS: 100 INJECTION, SOLUTION INTRAVENOUS; SUBCUTANEOUS at 20:19

## 2025-02-20 RX ADMIN — SODIUM CHLORIDE, PRESERVATIVE FREE 10 ML: 5 INJECTION INTRAVENOUS at 20:21

## 2025-02-20 RX ADMIN — CITALOPRAM HYDROBROMIDE 20 MG: 20 TABLET ORAL at 18:01

## 2025-02-20 RX ADMIN — ATENOLOL 25 MG: 25 TABLET ORAL at 18:01

## 2025-02-20 RX ADMIN — AMLODIPINE BESYLATE 2.5 MG: 2.5 TABLET ORAL at 18:01

## 2025-02-20 RX ADMIN — SODIUM CHLORIDE 1000 ML: 0.9 INJECTION, SOLUTION INTRAVENOUS at 15:32

## 2025-02-20 RX ADMIN — HYDROCODONE BITARTRATE AND ACETAMINOPHEN 1 TABLET: 10; 325 TABLET ORAL at 18:01

## 2025-02-20 RX ADMIN — GABAPENTIN 600 MG: 300 CAPSULE ORAL at 20:17

## 2025-02-20 ASSESSMENT — ENCOUNTER SYMPTOMS
NAUSEA: 0
CHEST TIGHTNESS: 0
SORE THROAT: 0
VOMITING: 0
SHORTNESS OF BREATH: 0
WHEEZING: 0
COUGH: 0
ABDOMINAL PAIN: 0
DIARRHEA: 0
BACK PAIN: 0

## 2025-02-20 ASSESSMENT — PAIN SCALES - GENERAL: PAINLEVEL_OUTOF10: 4

## 2025-02-20 NOTE — H&P
[facility]   History and Physical      CHIEF COMPLAINT:  Fall    History of Present Illness:  74 y.o. female with a history of depression, Anxiety, GERD, HLD, HTN, Hypothyroid, Liver Disease, Neuropathy, PVD, RLS,  presents following fall at home.Pt daughter at bedside.assisted with history. Pt states she was walking last evening and she tripped over her sock. States she was on the floor all night from about 1am-9am. States she did experience Nausea and did vomit one time through the night. States about 5-6 weeks prior she fell and broke her right wrist. She follows with Dr. Moy and does have upcoming appointment in the next 2 weeks. States she uses a cane for ambulation and it has been difficult to use 2/2 fracture. She was having increased falls in September. Does have Meningiomas and Follows with Neurosurgery . MRI Obtained early no new findings per patient. States she had noted incontinence today. She has never experienced incontinence. No burning or frequency noted. States she has a history of smoking, however cessation achieved about 7 years ago. She has followed with Dr. Mcmanus in the past. She denies fevers, chills, Diarrhea, CP, SOB. Pt received  1L NSS bolus in ED course. Decision to admit pt for further evaluation and treatment.     Informant(s) for H&P:Pt and EMR    REVIEW OF SYSTEMS:  Admit to Fall, left shoulder wrist and knee pain. Denies fevers, chills, cp, sob, n/v, ha, vision/hearing changes, wt changes, hot/cold flashes, other open skin lesions, diarrhea, constipation, dysuria/hematuria unless noted in HPI. Complete ROS performed with the patient and is otherwise negative.      PMH:  Past Medical History:   Diagnosis Date    Anxiety     Chronic back pain     Depression     Fungal infection     GERD (gastroesophageal reflux disease)     Headache(784.0)     Hearing loss     History of tobacco use 11/9/2017    Hyperlipidemia     Hypertension      MD Jass   hydroCHLOROthiazide (MICROZIDE) 12.5 MG capsule Take 12.5 mg by mouth daily.      Jass Orellana MD   amLODIPine (NORVASC) 5 MG tablet Take 2.5 mg by mouth daily     Jass Orellana MD   calcium carbonate 600 MG TABS tablet Take 1 tablet by mouth daily.    Patient not taking: Reported on 10/24/2022    Jass Orellana MD   Cholecalciferol (VITAMIN D3) 2000 UNIT TABS Take 2,000 Units by mouth Daily.      Jass Orellana MD   ASPIRIN by Does not apply route Daily.    Patient not taking: Reported on 10/24/2022    Jass Orellana MD       Allergies:    Midazolam hcl and Heparin    Social History:    reports that she quit smoking about 7 years ago. Her smoking use included cigarettes. She started smoking about 32 years ago. She has a 25 pack-year smoking history. She has never used smokeless tobacco. She reports current alcohol use. She reports that she does not use drugs.      Family History:   family history includes Arthritis in her brother, father, maternal aunt, mother, and paternal uncle; Asthma in her father, maternal aunt, and mother; Cancer in her father, maternal aunt, maternal grandmother, mother, and paternal uncle; Diabetes in her father, maternal grandfather, maternal uncle, mother, paternal grandfather, paternal grandmother, and paternal uncle; Heart Disease in her father, maternal aunt, maternal grandfather, maternal uncle, mother, paternal grandfather, paternal grandmother, and paternal uncle; High Blood Pressure in her brother, father, maternal aunt, maternal grandfather, maternal uncle, mother, paternal grandfather, paternal grandmother, and paternal uncle; High Cholesterol in her brother, father, maternal aunt, maternal grandfather, maternal uncle, mother, paternal grandmother, and paternal uncle; Kidney Disease in her father, maternal grandfather, and mother; Stroke in her mother and paternal grandmother. Reviewed    PHYSICAL EXAM:  Vitals:  BP (!)  XRAY VIEWS OF THE LEFT KNEE; TWO XRAY VIEWS OF THE RIGHT KNEE 2/20/2025 12:14 pm COMPARISON: 12/10/2021 HISTORY: ORDERING SYSTEM PROVIDED HISTORY: fall, pain TECHNOLOGIST PROVIDED HISTORY: Reason for exam:->fall, pain FINDINGS: Two views bilateral knees demonstrate bilateral progressive narrowing and sclerosis of the medial joint space compartments bilaterally consistent with the tricompartmental osteoarthritic changes.  There is no evidence of acute fracture or subluxation.  There is no evidence of a joint space effusion.     1. Progressive tricompartmental osteoarthritic changes of the knees bilaterally. 2. No evidence of acute fracture or subluxation.     XR WRIST LEFT (MIN 3 VIEWS)    Result Date: 2/20/2025  EXAMINATION: 3 XRAY VIEWS OF THE LEFT WRIST 2/20/2025 12:14 pm COMPARISON: None. HISTORY: ORDERING SYSTEM PROVIDED HISTORY: pain TECHNOLOGIST PROVIDED HISTORY: Reason for exam:->pain FINDINGS: Three views left wrist demonstrate osteopenia with no gross fracture or subluxation.  There is no evidence of radiopaque foreign bodies.  There are mild osteoarthritic changes most pronounced involving the 1st carpal metacarpal joint space.     1. Osteopenia with no gross fracture or subluxation. 2. Mild osteoarthritic changes most pronounced involving the 1st carpometacarpal joint space.     XR SHOULDER LEFT (MIN 2 VIEWS)    Result Date: 2/20/2025  EXAMINATION: THREE XRAY VIEWS OF THE LEFT SHOULDER 2/20/2025 12:14 pm COMPARISON: 12/10/2021 HISTORY: ORDERING SYSTEM PROVIDED HISTORY: fall, pain TECHNOLOGIST PROVIDED HISTORY: Reason for exam:->fall, pain FINDINGS: Three views of the left shoulder demonstrate questionable nondisplaced the fracture adjacent to the greater tubercle.  There is normal alignment with mild osteoarthritic changes.     Questionable nondisplaced fracture adjacent to the greater tubercle. RECOMMENDATION: If further evaluation is desired, I recommend CT of the left shoulder.     CT HEAD WO

## 2025-02-20 NOTE — TELEPHONE ENCOUNTER
Ortho answering Service called today, Kenyetta.    She stated that Marylou Blas, appointment scheduled 3/3/25, had fallen to the floor.  Her  is ill and unable to assist her up off the floor and she could not get up on her own.  Kenyetta called an ambulance to go to the home to assist.    Answ. Serv. Also stated that Marylou really, really wanted to talk to Dr. Looney.    After getting this call from the Answ. Serv. I called the patients mobile phone and her daughter answered.  She was on her way to the hospital where the [facility] the [facility]  I told her to call us if there was anything we could do for her.

## 2025-02-20 NOTE — CONSULTS
Department of Orthopedic Surgery  Resident Consult Note          Reason for Consult: Left shoulder pain    HISTORY OF PRESENT ILLNESS:      Patient 74-year-old female presenting to the hospital with left upper extremity pain.  Patient states that she fell landing on her left side and subsequently had pain in her wrist and her shoulder.  Ortho is consulted for concern on x-ray of a greater tuberosity fracture of the left shoulder.  She currently denies any new onset numbness, tingling, or paresthesias.  She denies any other orthopedic complaints this time.      Past Medical History:        Diagnosis Date    Anxiety     Chronic back pain     Depression     Fungal infection     GERD (gastroesophageal reflux disease)     Headache(784.0)     Hearing loss     History of tobacco use 11/9/2017    Hyperlipidemia     Hypertension     Hypothyroidism     Liver disease     Myalgia     Neck pain     Neuropathy     Numbness     Obesity     Palpitation     Pneumonia     Postnasal drip     PVD (peripheral vascular disease) with claudication 11/9/2017    Rash     Restless legs syndrome     Rib fracture     Skin ulcer of right great toe, limited to breakdown of skin (HCC) 11/9/2017    Weakness     Wounds, multiple      Past Surgical History:        Procedure Laterality Date    APPENDECTOMY  01/01/1970    BREAST SURGERY  01/01/1987    CERVICAL LAMINECTOMY      CHOLECYSTECTOMY  01/01/1970    COSMETIC SURGERY  01/01/1969    rhino    HYSTERECTOMY (CERVIX STATUS UNKNOWN)  01/01/1992    KNEE SURGERY  01/01/2007    PANCREAS SURGERY  01/01/1990    TUBAL LIGATION  01/01/1987     Current Medications:   Current Facility-Administered Medications: sodium chloride flush 0.9 % injection 5-40 mL, 5-40 mL, IntraVENous, 2 times per day  sodium chloride flush 0.9 % injection 5-40 mL, 5-40 mL, IntraVENous, PRN  0.9 % sodium chloride infusion, , IntraVENous, PRN  potassium chloride (KLOR-CON M) extended release tablet 40 mEq, 40 mEq, Oral, PRN **OR**  144/70   Pulse 89   Temp 98.2 °F (36.8 °C) (Oral)   Resp 22   Ht 1.753 m (5' 9\")   Wt 90.7 kg (200 lb)   SpO2 94%   BMI 29.53 kg/m²   CONSTITUTIONAL:  awake, alert, cooperative, no apparent distress, and appears stated age  MUSCULOSKELETAL:  Left upper Extremity:  Skin is intact circumvents about the left upper extremity  Mild swelling and ecchymosis overlying the radial aspect of the left wrist  Tenderness palpation overlying the ulnar aspect of the left wrist as well as the left shoulder  Mild tenderness palpation with passive range of motion of the left shoulder  No crepitance with passive range of motion of the wrist, elbow, shoulder  Sensation intact to median, radial, ulnar nerve distributions  Motor intact to AIN, PIN, ulnar nerve distributions  Distal extremities warm and well-perfused  Palpable radial pulse  Capillary refill <3 seconds    Secondary Exam:   rightUE: Wrist splint in place over right wrist.  -TTP to fingers, hand, wrist, forearm, elbow, humerus, shoulder or clavicle.    bilateralLE: No obvious signs of trauma.   -TTP to foot, ankle, leg, knee, thigh, hip.      DATA:    CBC:   Lab Results   Component Value Date/Time    WBC 12.2 02/20/2025 10:44 AM    RBC 4.73 02/20/2025 10:44 AM    HGB 14.6 02/20/2025 10:44 AM    HCT 41.2 02/20/2025 10:44 AM    MCV 87.1 02/20/2025 10:44 AM    MCH 30.9 02/20/2025 10:44 AM    MCHC 35.4 02/20/2025 10:44 AM    RDW 13.3 02/20/2025 10:44 AM     02/20/2025 10:44 AM    MPV 10.1 02/20/2025 10:44 AM       Radiology Review:  XR left shoulder 2/20/2025  Radiographs left shoulder demonstrate no acute osseous abnormalities.  Mild degenerative changes seen in the AC and glenohumeral joints.  No acute fractures or dislocations.    XR left wrist 2/20/2025  Radiographs of the left wrist demonstrate no acute osseous abnormalities.  No acute fractures or dislocations.    IMPRESSION:  Pain after fall    PLAN:  Will prescribe Velcro wrist splint for left wrist

## 2025-02-20 NOTE — ED PROVIDER NOTES
Chief complaint:  Fall    HPI history provided by the patient  The patient presents here stating that she broke her right wrist about 5 weeks ago and is currently in a splint.  She fell last night, she did not hit her head and had no loss of consciousness she states.  She states she landed on her left wrist and left shoulder and has pain in those areas as well as the bilateral knees mostly on the right knee.  Denies neck or back pain or injuries and actually denies head injury or headache and denies loss of consciousness.  She states that because her arms and shoulder hurts she could not get her self off of the floor last night and laid on the floor all night.  No abdominal pain and no chest pain or shortness of breath.  No extremity numbness, tingling, paresthesia or weakness.    Review of Systems   Constitutional:  Negative for chills, diaphoresis, fatigue and fever.   HENT:  Negative for congestion and sore throat.    Respiratory:  Negative for cough, chest tightness, shortness of breath and wheezing.    Cardiovascular:  Negative for chest pain, palpitations and leg swelling.   Gastrointestinal:  Negative for abdominal pain, diarrhea, nausea and vomiting.   Genitourinary:  Negative for dysuria, flank pain, frequency and urgency.   Musculoskeletal:  Positive for arthralgias. Negative for back pain, gait problem, joint swelling, myalgias, neck pain and neck stiffness.   Skin:  Negative for rash and wound.   Neurological:  Negative for dizziness, seizures, syncope, weakness, light-headedness, numbness and headaches.   All other systems reviewed and are negative.       Physical Exam  Vitals and nursing note reviewed.   Constitutional:       General: She is awake. She is not in acute distress.     Appearance: She is well-developed. She is not ill-appearing, toxic-appearing or diaphoretic.   HENT:      Head: Normocephalic and atraumatic.      Comments: No sign of acute head or face injuries  Eyes:      General: No

## 2025-02-20 NOTE — ED NOTES
Patient did stand at bedside with standby assist and side step to top of bed, safety cues needed. Pure wick placed.

## 2025-02-20 NOTE — PLAN OF CARE
Problem: Discharge Planning  Goal: Discharge to home or other facility with appropriate resources  Outcome: Progressing     Problem: Safety - Adult  Goal: Free from fall injury  Outcome: Progressing     Problem: ABCDS Injury Assessment  Goal: Absence of physical injury  Outcome: Progressing     Problem: Pain  Goal: Verbalizes/displays adequate comfort level or baseline comfort level  Outcome: Progressing     Problem: Skin/Tissue Integrity  Goal: Skin integrity remains intact  Description: 1.  Monitor for areas of redness and/or skin breakdown  2.  Assess vascular access sites hourly  3.  Every 4-6 hours minimum:  Change oxygen saturation probe site  4.  Every 4-6 hours:  If on nasal continuous positive airway pressure, respiratory therapy assess nares and determine need for appliance change or resting period  Outcome: Progressing

## 2025-02-20 NOTE — TELEPHONE ENCOUNTER
Kenyetta-Answering Service called back and stated that patients still at hospital. Her daughter is with her and has her phone so they're still available. Let her know what you stated below.

## 2025-02-21 VITALS
DIASTOLIC BLOOD PRESSURE: 69 MMHG | HEART RATE: 82 BPM | TEMPERATURE: 97.7 F | RESPIRATION RATE: 18 BRPM | OXYGEN SATURATION: 95 % | BODY MASS INDEX: 29.62 KG/M2 | WEIGHT: 200 LBS | SYSTOLIC BLOOD PRESSURE: 127 MMHG | HEIGHT: 69 IN

## 2025-02-21 PROBLEM — S63.502A SPRAIN OF LEFT WRIST: Status: ACTIVE | Noted: 2025-02-21
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
## 2025-02-21 PROBLEM — E78.2 MIXED HYPERLIPIDEMIA: Status: ACTIVE | Noted: 2025-02-21

## 2025-02-21 PROBLEM — I10 PRIMARY HYPERTENSION: Status: ACTIVE | Noted: 2025-02-21

## 2025-02-21 PROBLEM — E11.65 TYPE 2 DIABETES MELLITUS WITH HYPERGLYCEMIA, WITHOUT LONG-TERM CURRENT USE OF INSULIN (HCC): Status: ACTIVE | Noted: 2025-02-21

## 2025-02-21 LAB
ANION GAP SERPL CALCULATED.3IONS-SCNC: 10 MMOL/L (ref 7–16)
BASOPHILS # BLD: 0.03 K/UL (ref 0–0.2)
BASOPHILS NFR BLD: 0 % (ref 0–2)
BUN SERPL-MCNC: 10 MG/DL (ref 6–23)
CALCIUM SERPL-MCNC: 9.1 MG/DL (ref 8.6–10.2)
CHLORIDE SERPL-SCNC: 98 MMOL/L (ref 98–107)
CK SERPL-CCNC: 58 U/L (ref 20–180)
CO2 SERPL-SCNC: 26 MMOL/L (ref 22–29)
CREAT SERPL-MCNC: 0.6 MG/DL (ref 0.5–1)
EKG ATRIAL RATE: 81 BPM
EKG P AXIS: 54 DEGREES
EKG P-R INTERVAL: 140 MS
EKG Q-T INTERVAL: 356 MS
EKG QRS DURATION: 68 MS
EKG QTC CALCULATION (BAZETT): 413 MS
EKG R AXIS: 44 DEGREES
EKG T AXIS: 101 DEGREES
EKG VENTRICULAR RATE: 81 BPM
EOSINOPHIL # BLD: 0.07 K/UL (ref 0.05–0.5)
EOSINOPHILS RELATIVE PERCENT: 1 % (ref 0–6)
ERYTHROCYTE [DISTWIDTH] IN BLOOD BY AUTOMATED COUNT: 13.5 % (ref 11.5–15)
GFR, ESTIMATED: >90 ML/MIN/1.73M2
GLUCOSE BLD-MCNC: 200 MG/DL (ref 74–99)
GLUCOSE BLD-MCNC: 267 MG/DL (ref 74–99)
GLUCOSE SERPL-MCNC: 176 MG/DL (ref 74–99)
HBA1C MFR BLD: 7.6 % (ref 4–5.6)
HCT VFR BLD AUTO: 40.2 % (ref 34–48)
HGB BLD-MCNC: 14.1 G/DL (ref 11.5–15.5)
IMM GRANULOCYTES # BLD AUTO: 0.03 K/UL (ref 0–0.58)
IMM GRANULOCYTES NFR BLD: 0 % (ref 0–5)
LYMPHOCYTES NFR BLD: 1.67 K/UL (ref 1.5–4)
LYMPHOCYTES RELATIVE PERCENT: 20 % (ref 20–42)
MAGNESIUM SERPL-MCNC: 1.8 MG/DL (ref 1.6–2.6)
MCH RBC QN AUTO: 30.7 PG (ref 26–35)
MCHC RBC AUTO-ENTMCNC: 35.1 G/DL (ref 32–34.5)
MCV RBC AUTO: 87.6 FL (ref 80–99.9)
MONOCYTES NFR BLD: 0.75 K/UL (ref 0.1–0.95)
MONOCYTES NFR BLD: 9 % (ref 2–12)
NEUTROPHILS NFR BLD: 69 % (ref 43–80)
NEUTS SEG NFR BLD: 5.76 K/UL (ref 1.8–7.3)
PLATELET # BLD AUTO: 198 K/UL (ref 130–450)
PMV BLD AUTO: 10.6 FL (ref 7–12)
POTASSIUM SERPL-SCNC: 3.5 MMOL/L (ref 3.5–5)
RBC # BLD AUTO: 4.59 M/UL (ref 3.5–5.5)
SODIUM SERPL-SCNC: 134 MMOL/L (ref 132–146)
WBC OTHER # BLD: 8.3 K/UL (ref 4.5–11.5)

## 2025-02-21 PROCEDURE — G0378 HOSPITAL OBSERVATION PER HR: HCPCS

## 2025-02-21 PROCEDURE — 97161 PT EVAL LOW COMPLEX 20 MIN: CPT | Performed by: PHYSICAL THERAPIST

## 2025-02-21 PROCEDURE — 82962 GLUCOSE BLOOD TEST: CPT

## 2025-02-21 PROCEDURE — 83036 HEMOGLOBIN GLYCOSYLATED A1C: CPT

## 2025-02-21 PROCEDURE — 80048 BASIC METABOLIC PNL TOTAL CA: CPT

## 2025-02-21 PROCEDURE — 85025 COMPLETE CBC W/AUTO DIFF WBC: CPT

## 2025-02-21 PROCEDURE — 99238 HOSP IP/OBS DSCHRG MGMT 30/<: CPT | Performed by: INTERNAL MEDICINE

## 2025-02-21 PROCEDURE — 97165 OT EVAL LOW COMPLEX 30 MIN: CPT

## 2025-02-21 PROCEDURE — 93010 ELECTROCARDIOGRAM REPORT: CPT | Performed by: INTERNAL MEDICINE

## 2025-02-21 PROCEDURE — APPSS45 APP SPLIT SHARED TIME 31-45 MINUTES: Performed by: NURSE PRACTITIONER

## 2025-02-21 PROCEDURE — 97530 THERAPEUTIC ACTIVITIES: CPT | Performed by: PHYSICAL THERAPIST

## 2025-02-21 PROCEDURE — 97535 SELF CARE MNGMENT TRAINING: CPT

## 2025-02-21 PROCEDURE — 2500000003 HC RX 250 WO HCPCS: Performed by: NURSE PRACTITIONER

## 2025-02-21 PROCEDURE — 82550 ASSAY OF CK (CPK): CPT

## 2025-02-21 PROCEDURE — 83735 ASSAY OF MAGNESIUM: CPT

## 2025-02-21 PROCEDURE — 6370000000 HC RX 637 (ALT 250 FOR IP): Performed by: NURSE PRACTITIONER

## 2025-02-21 PROCEDURE — 36415 COLL VENOUS BLD VENIPUNCTURE: CPT

## 2025-02-21 RX ORDER — GABAPENTIN 300 MG/1
600 CAPSULE ORAL 3 TIMES DAILY
Refills: 0 | DISCHARGE
Start: 2025-02-21 | End: 2025-03-23

## 2025-02-21 RX ORDER — LEVOTHYROXINE SODIUM 50 UG/1
50 TABLET ORAL DAILY
DISCHARGE
Start: 2025-02-22

## 2025-02-21 RX ORDER — AMLODIPINE BESYLATE 2.5 MG/1
2.5 TABLET ORAL DAILY
DISCHARGE
Start: 2025-02-22

## 2025-02-21 RX ORDER — ATENOLOL 25 MG/1
25 TABLET ORAL DAILY
DISCHARGE
Start: 2025-02-22

## 2025-02-21 RX ORDER — POLYETHYLENE GLYCOL 3350 17 G/17G
17 POWDER, FOR SOLUTION ORAL DAILY PRN
DISCHARGE
Start: 2025-02-21 | End: 2025-03-23

## 2025-02-21 RX ORDER — HYDROCODONE BITARTRATE AND ACETAMINOPHEN 10; 325 MG/1; MG/1
1 TABLET ORAL EVERY 6 HOURS PRN
Qty: 12 TABLET | Refills: 0 | Status: SHIPPED | OUTPATIENT
Start: 2025-02-21 | End: 2025-02-24

## 2025-02-21 RX ADMIN — SODIUM CHLORIDE, PRESERVATIVE FREE 10 ML: 5 INJECTION INTRAVENOUS at 10:58

## 2025-02-21 RX ADMIN — GABAPENTIN 600 MG: 300 CAPSULE ORAL at 14:09

## 2025-02-21 RX ADMIN — CITALOPRAM HYDROBROMIDE 20 MG: 20 TABLET ORAL at 09:34

## 2025-02-21 RX ADMIN — LEVOTHYROXINE SODIUM 50 MCG: 0.05 TABLET ORAL at 06:48

## 2025-02-21 RX ADMIN — AMLODIPINE BESYLATE 2.5 MG: 2.5 TABLET ORAL at 09:34

## 2025-02-21 RX ADMIN — HYDROCODONE BITARTRATE AND ACETAMINOPHEN 1 TABLET: 10; 325 TABLET ORAL at 02:16

## 2025-02-21 RX ADMIN — HYDROCODONE BITARTRATE AND ACETAMINOPHEN 1 TABLET: 10; 325 TABLET ORAL at 10:57

## 2025-02-21 RX ADMIN — GABAPENTIN 600 MG: 300 CAPSULE ORAL at 09:33

## 2025-02-21 RX ADMIN — INSULIN LISPRO 2 UNITS: 100 INJECTION, SOLUTION INTRAVENOUS; SUBCUTANEOUS at 11:58

## 2025-02-21 RX ADMIN — INSULIN LISPRO 1 UNITS: 100 INJECTION, SOLUTION INTRAVENOUS; SUBCUTANEOUS at 09:34

## 2025-02-21 RX ADMIN — ATENOLOL 25 MG: 25 TABLET ORAL at 09:34

## 2025-02-21 ASSESSMENT — PAIN SCALES - GENERAL
PAINLEVEL_OUTOF10: 4
PAINLEVEL_OUTOF10: 8
PAINLEVEL_OUTOF10: 8

## 2025-02-21 ASSESSMENT — PAIN DESCRIPTION - DESCRIPTORS: DESCRIPTORS: ACHING;DISCOMFORT;PRESSURE

## 2025-02-21 ASSESSMENT — PAIN DESCRIPTION - ORIENTATION: ORIENTATION: LEFT

## 2025-02-21 ASSESSMENT — PAIN DESCRIPTION - LOCATION
LOCATION: GENERALIZED
LOCATION: RIB CAGE

## 2025-02-21 NOTE — CARE COORDINATION
Case Management Assessment  Initial Evaluation    Date/Time of Evaluation: 2/21/2025 8:56 AM  Assessment Completed by: YUNIOR Teran    If patient is discharged prior to next notation, then this note serves as note for discharge by case management.    Patient Name: Brandy Alexandra                   YOB: 1950  Diagnosis: Sprain of left wrist, initial encounter [S63.502A]  Fall, initial encounter [W19.XXXA]  Fall at home, initial encounter [W19.XXXA, Y92.009]  Other closed nondisplaced fracture of proximal end of left humerus, initial encounter [S42.295A]                   Date / Time: 2/20/2025  9:55 AM    Patient Admission Status: Inpatient   Readmission Risk (Low < 19, Mod (19-27), High > 27): Readmission Risk Score: 8.3    Current PCP: Kieran Patel MD  PCP verified by CM? Yes    Chart Reviewed: Yes      History Provided by: Patient  Patient Orientation: Alert and Oriented    Patient Cognition: Alert    Hospitalization in the last 30 days (Readmission):  No    If yes, Readmission Assessment in  Navigator will be completed.    Advance Directives:      Code Status: Full Code   Patient's Primary Decision Maker is: Legal Next of Kin    Primary Decision Maker: Raymond Alexandra F - Spouse - [phone]    Secondary Decision Maker: Adriane Kwon - Child - [phone]    Secondary Decision Maker: Ibrahima,Anuradha - Child - [phone]    Discharge Planning:    Patient lives with: Alone Type of Home: House  Primary Care Giver: Self  Patient Support Systems include: Children, Family Members   Current Financial resources:    Current community resources:    Current services prior to admission: None            Current DME:              Type of Home Care services:  None    ADLS  Prior functional level: Independent in ADLs/IADLs  Current functional level: Assistance with the following:, Bathing, Dressing, Toileting, Mobility    PT AM-PAC:   /24  OT AM-PAC:   /24    Family can provide assistance at DC:  Yes  Would you like Case Management to discuss the discharge plan with any other family members/significant others, and if so, who? Yes (pt's  or daughters)  Plans to Return to Present Housing: No  Other Identified Issues/Barriers to RETURNING to current housing: [facility]ESSIA- Pre cert   Potential Assistance needed at discharge: PT/OT            Potential DME:    Patient expects to discharge to: Skilled nursing facility  Plan for transportation at discharge:      Financial    Payor: [facility] MEDICARE / Plan: [facility] MEDICARE COMPLETE / Product Type: *No Product type* /     Does insurance require precert for SNF: Yes    Potential assistance Purchasing Medications:    Meds-to-Beds request:        [id number] - [place], [place] - [address] [phone] - F [phone]  [address] [facility] [address]  Phone: [phone] Fax: [phone]    [facility] [address] [place] [address] ([facility] [place] - [address] [facility] - P [phone] - F [phone]  [address] [facility] ([place]) [place] [address]  Phone: [phone] Fax: [phone]      Notes:    Factors facilitating achievement of predicted outcomes: Family support, Motivated, Cooperative, and Pleasant    Barriers to discharge: Pre cert-  Pain    Additional Case Management Notes: 2/21/2025: SS NOTE:  SS Consult for \"ALESSIA placement, pt/dtr choiced and prefer [facility]\", pt presents from home with a fall and wrist sprain,referral made to [facility] admissions liaison who confirmed that they do have a rehab bed available and she will follow for PT/OT theresa to SUBMIT PRE CERT for anticipated transfer later today, pt informed and agreeable to d/c plan, TAMI and HENS initiated, sw/cm will follow to confirm transfer arrangements, Nursing informed.Electronically signed by YUNIOR Teran on 2/21/2025 at 8:41 AM    The Plan for Transition of Care is related to the following treatment goals of Sprain of left wrist, initial encounter [D46.844A]  Fall,  initial encounter [W19.XXXA]  Fall at home, initial encounter [W19.XXXA, Y92.009]  Other closed nondisplaced fracture of proximal end of left humerus, initial encounter [S42.295A]    IF APPLICABLE: The Patient and/or patient representative Brandy and her family were provided with a choice of provider and agrees with the discharge plan. Freedom of choice list with basic dialogue that supports the patient's individualized plan of care/goals and shares the quality data associated with the providers was provided to:     Patient Representative Name:       The Patient and/or Patient Representative Agree with the Discharge Plan?      YUNIOR Teran  Case Management Department  Ph: [phone]

## 2025-02-21 NOTE — DISCHARGE INSTR - COC
Continuity of Care Form    Patient Name: Brandy Alexandra   :  1950  MRN:  [id number]    Admit date:  2025  Discharge date:  2025    Code Status Order: Full Code   Advance Directives:   Advance Care Flowsheet Documentation             Admitting Physician:  Kieran Townsend MD  PCP: Kieran Patel MD    Discharging Nurse: Julia  Discharging Hospital Unit/Room#: 0339/0339-01  Discharging Unit Phone Number: [phone]    Emergency Contact:   Extended Emergency Contact Information  Primary Emergency Contact: Raymond Alexandra  Address: [address] [place] [address] [address] [place] [id number]  Home Phone: [phone]  Relation: Spouse  Secondary Emergency Contact: SimonpatAdriane   [facility]  Home Phone: [phone]  Relation: Child    Past Surgical History:  Past Surgical History:   Procedure Laterality Date    APPENDECTOMY  1970    BREAST SURGERY  1987    CERVICAL LAMINECTOMY      CHOLECYSTECTOMY  1970    COSMETIC SURGERY  1969    rhino    HYSTERECTOMY (CERVIX STATUS UNKNOWN)  1992    KNEE SURGERY  2007    PANCREAS SURGERY  1990    TUBAL LIGATION  1987       Immunization History:   Immunization History   Administered Date(s) Administered    COVID-19, PFIZER Bivalent, DO NOT Dilute, (age 12y+), IM, 30 mcg/0.3 mL 10/27/2022    COVID-19, PFIZER PURPLE top, DILUTE for use, (age 12 y+), 30mcg/0.3mL 2021, 2021, 10/25/2021    COVID-19, PFIZER, (age 12y+), IM, 30mcg/0.3mL 2023       Active Problems:  Patient Active Problem List   Diagnosis Code    Skin ulcer of right great toe, limited to breakdown of skin ([facility]) L97.511    PVD (peripheral vascular disease) with claudication I73.9    History of tobacco use Z87.891    Fall at home, initial encounter W19.XXXA, Y92.009    Sprain of left wrist S63.502A    Type 2 diabetes mellitus with hyperglycemia, without long-term current use of insulin (HCC) E11.65    Primary hypertension I10    Mixed  hyperlipidemia E78.2    Fall W19.XXXA       Isolation/Infection:   Isolation            No Isolation          Patient Infection Status       None to display            Nurse Assessment:  Last Vital Signs: /69   Pulse 82   Temp 97.7 °F (36.5 °C) (Oral)   Resp 18   Ht 1.753 m (5' 9\")   Wt 90.7 kg (200 lb)   SpO2 95%   BMI 29.53 kg/m²     Last documented pain score (0-10 scale): Pain Level: 8  Last Weight:   Wt Readings from Last 1 Encounters:   02/20/25 90.7 kg (200 lb)     Mental Status:  oriented and alert    IV Access:  - None    Nursing Mobility/ADLs:  Walking   Assisted  Transfer  Assisted  Bathing  Assisted  Dressing  Assisted  Toileting  Assisted  Feeding  Independent  Med Admin  Assisted  Med Delivery   whole    Wound Care Documentation and Therapy:        Elimination:  Continence:   Bowel: Yes  Bladder: No  Urinary Catheter: None   Colostomy/Ileostomy/Ileal Conduit: No       Date of Last BM: ***    Intake/Output Summary (Last 24 hours) at 2/21/2025 1226  Last data filed at 2/21/2025 0522  Gross per 24 hour   Intake 320 ml   Output 803 ml   Net -483 ml     I/O last 3 completed shifts:  In: 320 [P.O.:320]  Out: 803 [Urine:801; Stool:2]    Safety Concerns:     History of Falls (last 30 days)    Impairments/Disabilities:      {[facility] Impairments/Disabilities:[id number]}    Nutrition Therapy:  Current Nutrition Therapy:   - Oral Diet:  Carb Control 4 carbs/meal (1800kcals/day)    Routes of Feeding: Oral  Liquids: Thin Liquids  Daily Fluid Restriction: no  Last Modified Barium Swallow with Video (Video Swallowing Test): not done    Treatments at the Time of Hospital Discharge:   Respiratory Treatments: ***  Oxygen Therapy:  is not on home oxygen therapy.  Ventilator:    - No ventilator support    Rehab Therapies: Physical Therapy and Occupational Therapy  Weight Bearing Status/Restrictions: No weight bearing restrictions  Other Medical Equipment (for information only, NOT a DME order):   {EQUIPMENT:[id number]}  Other Treatments: ***    Patient's personal belongings (please select all that are sent with patient):  Dentures Partials    RN SIGNATURE:  Electronically signed by Allison Byrne RN on 2/21/25 at 2:08 PM EST    CASE MANAGEMENT/SOCIAL WORK SECTION    Inpatient Status Date: 2/20/2025    Readmission Risk Assessment Score:  [facility] RISK OF UNPLANNED READMISSION 2.0             8.3 Total Score        Discharging to Facility/ Agency   Name: [facility]: (SNF)  Address:  Phone: [phone]  Fax: [phone]    Dialysis Facility (if applicable)   Name:  Address:  Dialysis Schedule:  Phone:  Fax:    / signature: Electronically signed by YUNIOR Teran on 2/21/25 at 12:26 PM EST    PHYSICIAN SECTION    Prognosis: Good    Condition at Discharge: Stable    Rehab Potential (if transferring to Rehab): Good    Recommended Labs or Other Treatments After Discharge: BMP and CBC weekly on Mondays starting 2/24    Physician Certification: I certify the above information and transfer of Brandy Alexandra  is necessary for the continuing treatment of the diagnosis listed and that she requires Skilled Nursing Facility for less 30 days.     Update Admission H&P: Changes in H&P as follows - See discharge summary    PHYSICIAN SIGNATURE:  Electronically signed by Kieran Townsend MD on 2/21/25 at 2:11 PM EST

## 2025-02-21 NOTE — CARE COORDINATION
2/21/2025: SS NOTE:  Notified by Salud, admissions for [facility] that they received PRE CERT and transfer confirmed for today, pt and pt's daughter, Anuradha notified and prefer to private pay for [employer] ambulette for $75 vs taking pt by private car, transport scheduled for 3:00 pm and dtr to call with a credit card TAMI and ALEX completed,Nursing informed.Electronically signed by YUNIOR Teran on 2/21/2025 at 2:16 PM

## 2025-02-21 NOTE — PROGRESS NOTES
Physical Therapy Initial Evaluation/Plan of Care    Room #:  0339/0339-01  Patient Name: Brandy Alexandra  YOB: 1950  MRN: [id number]    Date of Service: 2/21/2025     Tentative placement recommendation: Subacute Rehab  Equipment recommendation: To be determined      Evaluating Physical Therapist: Savana Landaverde, PT #[id number]      Specific Provider Orders/Date/Referring Provider :  02/20/25 1645    PT evaluation and treat  Start:  02/20/25 1645,   End:  02/20/25 1645,   ONE TIME,   Standing Count:  1 Occurrences,   R       Allison Hook, APRN - CNP    Admitting Diagnosis:   Sprain of left wrist, initial encounter [S63.502A]  Fall, initial encounter [W19.XXXA]  Fall at home, initial encounter [W19.XXXA, Y92.009]  Other closed nondisplaced fracture of proximal end of left humerus, initial encounter [S42.295A]      left upper extremity pain.    Surgery: none  Visit Diagnoses         Codes    Other closed nondisplaced fracture of proximal end of left humerus, initial encounter    -  Primary S42.295A            Patient Active Problem List   Diagnosis    Skin ulcer of right great toe, limited to breakdown of skin (HCC)    PVD (peripheral vascular disease) with claudication    History of tobacco use    Fall at home, initial encounter    Sprain of left wrist    Type 2 diabetes mellitus with hyperglycemia, without long-term current use of insulin (HCC)    Primary hypertension    Mixed hyperlipidemia    Fall        ASSESSMENT of Current Deficits Patient exhibits decreased strength, balance, and endurance impairing functional mobility, transfers, gait , gait distance, and tolerance to activity assist of 2 for stand/transfers and gait. Bowel incontinence. Patient requires continued skilled physical therapy to address concerns listed above for increased safety and function at discharge.        PHYSICAL THERAPY  PLAN OF CARE       Physical therapy plan of care is established based on physician order,  patient

## 2025-02-21 NOTE — DISCHARGE SUMMARY
[facility] Hospitalist       Hospitalist Physician Discharge Summary       [facility] [address] [id number] [phone]        Jonathan Goodrich DO  [address] [facility] [id number] [phone]    Call  If symptoms worsen, As needed    Kieran Patel MD  [address] [place] [id number] [phone]    Call  As needed      No future appointments.    Activity level: As Tolerated     Diet: ADULT DIET; Regular; 5 carb choices (75 gm/meal)      Condition at discharge: Stable    Dispo:[facility]      Patient ID:  Brandy Alexandra  [id number]  74 y.o.  1950    Admit date: 2/20/2025    Discharge date and time:  2/21/2025  2:26 PM    Admission Diagnoses: Principal Problem:    Fall at home, initial encounter  Active Problems:    Sprain of left wrist    Type 2 diabetes mellitus with hyperglycemia, without long-term current use of insulin (HCC)    Primary hypertension    Mixed hyperlipidemia    Fall  Resolved Problems:    * No resolved hospital problems. *      Discharge Diagnoses: Principal Problem:    Fall at home, initial encounter  Active Problems:    Sprain of left wrist    Type 2 diabetes mellitus with hyperglycemia, without long-term current use of insulin (HCC)    Primary hypertension    Mixed hyperlipidemia    Fall  Resolved Problems:    * No resolved hospital problems. *      Consults:  IP CONSULT TO INTERNAL MEDICINE  IP CONSULT TO ORTHOPEDIC SURGERY  IP CONSULT TO SOCIAL WORK    Procedures: None    Hospital Course:   2/20/2025 Pt presented to ED with complaints of fall. Pt states she was walking last evening and she tripped over her sock. States she was on the floor all night from about 1am-9am. States she did experience Nausea and did vomit one time through the night. States about 5-6 weeks prior she fell and broke her right wrist. She follows with Dr. Moy and does have upcoming appointment in the next 2 weeks. States she uses

## 2025-02-21 NOTE — PLAN OF CARE
Problem: Discharge Planning  Goal: Discharge to home or other facility with appropriate resources  2/21/2025 0154 by Tasneem Varela RN  Outcome: Progressing  2/20/2025 1718 by Cynthia Link RN  Outcome: Progressing     Problem: Safety - Adult  Goal: Free from fall injury  2/21/2025 0154 by Tasneem Varela RN  Outcome: Progressing  2/20/2025 1718 by Cynthia Link RN  Outcome: Progressing     Problem: ABCDS Injury Assessment  Goal: Absence of physical injury  2/21/2025 0154 by Tasneem Varela RN  Outcome: Progressing  2/20/2025 1718 by Cynthia Link RN  Outcome: Progressing     Problem: Pain  Goal: Verbalizes/displays adequate comfort level or baseline comfort level  2/21/2025 0154 by Tasneem Varela RN  Outcome: Progressing  2/20/2025 1718 by Cynthia Link RN  Outcome: Progressing     Problem: Skin/Tissue Integrity  Goal: Skin integrity remains intact  Description: 1.  Monitor for areas of redness and/or skin breakdown  2.  Assess vascular access sites hourly  3.  Every 4-6 hours minimum:  Change oxygen saturation probe site  4.  Every 4-6 hours:  If on nasal continuous positive airway pressure, respiratory therapy assess nares and determine need for appliance change or resting period  2/21/2025 0154 by Tasneem Varela RN  Outcome: Progressing  2/20/2025 1718 by Cynthia Link RN  Outcome: Progressing

## 2025-02-21 NOTE — CARE COORDINATION
2/21/2025: SS NOTE:  Notified by Salud, admissions for [facility] of pt accepted medically for [facility] placement and will follow for PT/OT evals to SUBMIT PRE CERT for anticipated pt informed and agreeable to d/c plan, TAMI initiated and HENS completed, sw/cm will follow to confirm transfer arrangements, Nursing informed.Electronically signed by YUNIOR Teran on 2/21/2025 at 12:25 PM

## 2025-02-21 NOTE — ACP (ADVANCE CARE PLANNING)
Advance Care Planning   Healthcare Decision Maker:    Primary Decision Maker: Raymond Alexandra CRYSTAL - Spouse - [phone]    Secondary Decision Maker: Adriane Kwon - Child - [phone]    Secondary Decision Maker: IbrahimaAnuradha - Child - [phone]    Click here to complete Healthcare Decision Makers including selection of the Healthcare Decision Maker Relationship (ie \"Primary\").  Today we documented Decision Maker(s) consistent with Legal Next of Kin hierarchy.

## 2025-02-21 NOTE — PROGRESS NOTES
OCCUPATIONAL THERAPY INITIAL EVALUATION    [facility]  [address] [place]        Date:2025                                                  Patient Name: Brandy Alexandra    MRN: [id number]    : 1950    Room: [address] [place] [address]      Evaluating OT: Markus Kline OTR/L; #[id number]     Referring Provider and Specific Provider Orders/Date:      25  OT eval and treat  Start:  25,   End:  25,   ONE TIME,   Standing Count:  1 Occurrences,   R         Allison Hook, APRN - CNP        Placement Recommendation: Subacute Rehab       Diagnosis:   1. Other closed nondisplaced fracture of proximal end of left humerus, initial encounter    2. Sprain of left wrist, initial encounter    3. Fall, initial encounter         Surgery: None      Pertinent Medical History:       Past Medical History:   Diagnosis Date    Anxiety     Chronic back pain     Depression     Fungal infection     GERD (gastroesophageal reflux disease)     Headache(784.0)     Hearing loss     History of tobacco use 2017    Hyperlipidemia     Hypertension     Hypothyroidism     Liver disease     Myalgia     Neck pain     Neuropathy     Numbness     Obesity     Palpitation     Pneumonia     Postnasal drip     PVD (peripheral vascular disease) with claudication 2017    Rash     Restless legs syndrome     Rib fracture     Skin ulcer of right great toe, limited to breakdown of skin (HCC) 2017    Weakness     Wounds, multiple          Past Surgical History:   Procedure Laterality Date    APPENDECTOMY  1970    BREAST SURGERY  1987    CERVICAL LAMINECTOMY      CHOLECYSTECTOMY  1970    COSMETIC SURGERY  1969    rhino    HYSTERECTOMY (CERVIX STATUS UNKNOWN)  1992    KNEE SURGERY  2007    PANCREAS SURGERY  1990    TUBAL LIGATION  1987        Precautions:  Fall Risk, Weightbearing as tolerated left upper extremity,Up  EOB, bedside commode and chair with cues for body alignment, safety and hand placement.  ADL completion: Self-care retraining for the above-mentioned ADLs; training on proper hand placement, safety technique, sequencing, and energy conservation techniques.  Postural Balance: Sitting/standing balance retraining to improve righting reactions with postural changes during ADLs.        Rehab Potential: Good for established goals.      Patient / Family Goal: Patient wants to walk and return home      Patient and/or family were instructed on functional diagnosis, prognosis/goals and OT plan of care. Demonstrated good understanding.     Eval Complexity: Low  History: Brief review of medical records and additional review of physical, cognitive, or psychosocial history related to current functional performance  Exam: 3+ performance deficits  Assistance/Modification: Mod assistance or modifications required to perform tasks. May have comorbidities that affect occupational performance.    Time In: 1:05 PM   Time Out: 1:45 PM    Total Treatment Time: 25      Min Units   OT Eval Low 97165  X  1    OT Eval Medium 32601      OT Eval High 27759      OT Re-Eval 95482            ADL/Self Care 00474 25 2   Therapeutic Activities 12903       Therapeutic Ex 31164       Orthotic Management 13545       Manual 15728     Neuro Re-Ed 62999       Non-Billable Time        Evaluation Time additionally includes thorough review of current medical information, gathering information on past medical history/social history and prior level of function, interpretation of standardized testing/informal observation of tasks, assessment of data and development of plan of care and goals.        Evaluating OT: Markus Kline OTR/L; #[id number]

## 2025-02-21 NOTE — DISCHARGE INSTRUCTIONS
Your information:  Name: Barndy Alexandra  : 1950    Your instructions:  Discharge to [facility]  Continue wrist splint for comfort    Signs and symptoms to watch out for:   Call your doctor now or seek immediate medical care if:    You have new or worse weakness.     You are dizzy or lightheaded, or you feel like you may faint.   Watch closely for changes in your health, and be sure to contact your doctor if:    You do not get better as expected.       What to do after you leave the hospital:    Recommended diet: diabetic diet    Recommended activity: activity as tolerated        The following personal items were collected during your admission and were returned to you:    Belongings  Dental Appliances: At home, Partials  Vision - Corrective Lenses: At home  Hearing Aid: None  Clothing: Socks, Shirt, Pants, Undergarments  Jewelry: None  Body Piercings Removed: N/A  Electronic Devices: Cell Phone  Weapons (Notify Protective Services/Security): None  Other Valuables: At home  Home Medications: None  Valuables Given To: Patient  Provide Name(s) of Who Valuable(s) Were Given To: brandy  Responsible person(s) in the waiting room: n/a  Patient approves for provider to speak to responsible person post operatively: Yes    Information obtained by:  By signing below, I understand that if any problems occur once I leave the hospital I am to contact Dr. Patel.  I understand and acknowledge receipt of the instructions indicated above.

## 2025-02-21 NOTE — TELEPHONE ENCOUNTER
Spoke with patient.  She was admitted to hospital due to difficulty with ambulation.  She was worried she also injured her other wrist, but she reports that x-rays at outside hospital did not demonstrate any fracture.  She will likely be going to rehab and is planning on keeping her appointment scheduled for 2 weeks.

## 2025-02-22 LAB
MICROORGANISM SPEC CULT: ABNORMAL
MICROORGANISM SPEC CULT: ABNORMAL
SERVICE CMNT-IMP: ABNORMAL
SPECIMEN DESCRIPTION: ABNORMAL

## 2025-02-25 ENCOUNTER — TELEPHONE (OUTPATIENT)
Dept: ORTHOPEDIC SURGERY | Age: 75
End: 2025-02-25

## 2025-02-25 NOTE — TELEPHONE ENCOUNTER
Spoke to the patient liaison at [facility].  Patient has no fractures, so there is no real reason for a follow up.  If patient continues to have pain or issues they will call.

## 2025-02-25 NOTE — TELEPHONE ENCOUNTER
Nursing home called in request to get scheduled with Dr. Goodrich for ED f/u, informed Dr. Vuong was on call but would like to come see Dr. Goodrich best call back is [phone]

## 2025-03-03 ENCOUNTER — APPOINTMENT (OUTPATIENT)
Dept: ORTHOPEDIC SURGERY | Facility: CLINIC | Age: 75
End: 2025-03-03
Payer: COMMERCIAL

## 2025-03-03 ENCOUNTER — HOSPITAL ENCOUNTER (OUTPATIENT)
Dept: RADIOLOGY | Facility: HOSPITAL | Age: 75
Discharge: HOME | End: 2025-03-03
Payer: COMMERCIAL

## 2025-03-03 DIAGNOSIS — S52.501A NONDISPLACED FRACTURE OF DISTAL END OF RIGHT RADIUS: ICD-10-CM

## 2025-03-03 DIAGNOSIS — S60.212A CONTUSION OF LEFT WRIST, INITIAL ENCOUNTER: Primary | ICD-10-CM

## 2025-03-03 PROCEDURE — 1159F MED LIST DOCD IN RCRD: CPT | Performed by: ORTHOPAEDIC SURGERY

## 2025-03-03 PROCEDURE — 73110 X-RAY EXAM OF WRIST: CPT | Mod: RIGHT SIDE | Performed by: RADIOLOGY

## 2025-03-03 PROCEDURE — 1125F AMNT PAIN NOTED PAIN PRSNT: CPT | Performed by: ORTHOPAEDIC SURGERY

## 2025-03-03 PROCEDURE — 1036F TOBACCO NON-USER: CPT | Performed by: ORTHOPAEDIC SURGERY

## 2025-03-03 PROCEDURE — 73110 X-RAY EXAM OF WRIST: CPT | Mod: RT

## 2025-03-03 PROCEDURE — 99213 OFFICE O/P EST LOW 20 MIN: CPT | Performed by: ORTHOPAEDIC SURGERY

## 2025-03-03 ASSESSMENT — PAIN - FUNCTIONAL ASSESSMENT: PAIN_FUNCTIONAL_ASSESSMENT: 0-10

## 2025-03-03 ASSESSMENT — PAIN SCALES - GENERAL: PAINLEVEL_OUTOF10: 8

## 2025-03-04 NOTE — PROGRESS NOTES
History of Present Illness:  Chief Complaint   Patient presents with    Right Wrist - Follow-up     Distal radius fracture    Left Wrist - Follow-up     Left wrist sprain     Patient has been undergoing nonoperative treatment for right distal radius fracture.  Right wrist is feeling significantly improved.  She did have a more recent fall February 20, 2025.  Radiographs were taken at an outside hospital and reportedly negative for fracture.  She has been wearing wrist brace on both sides.  Still with some aching pains and soreness primarily on the left.    Past Medical History:   Diagnosis Date    Carpal tunnel syndrome, bilateral upper limbs 04/11/2018    Bilateral carpal tunnel syndrome    Personal history of other diseases of the nervous system and sense organs 12/03/2018    History of peripheral neuropathy    Radiculopathy, cervical region 04/11/2018    Cervical radiculopathy at C5    Radiculopathy, cervical region 04/11/2018    Cervical radiculopathy at C6    Radiculopathy, cervical region 04/11/2018    Cervical radiculopathy at C7       Medication Documentation Review Audit       Reviewed by Racheal Irene CMA (Medical Assistant) on 03/03/25 at 1121      Medication Order Taking? Sig Documenting Provider Last Dose Status   amLODIPine (Norvasc) 2.5 mg tablet [id number] No Take 1 tablet (2.5 mg) by mouth once daily. Historical Provider, MD Taking Active   amLODIPine (Norvasc) 5 mg tablet [id number] No Take by mouth. Historical Provider, MD Taking Active   atenolol (Tenormin) 25 mg tablet [id number] No Take 1 tablet (25 mg) by mouth once daily. Historical Provider, MD Taking Active   cholecalciferol (Vitamin D-3) 50 MCG (2000 UT) tablet [id number] No Take 1 tablet (2,000 Units) by mouth once daily. Historical Provider, MD Taking Active   citalopram (CeleXA) 10 mg tablet [id number] No Take 1 tablet (10 mg) by mouth once daily. Historical Provider, MD Taking Active   gabapentin (Neurontin) 600 mg tablet [id number]  Take 1  tablet (600 mg) by mouth 3 times a day. Do not fill before 2024. SCOTT Agrawal  Active   glipiZIDE XL (Glucotrol XL) 10 mg 24 hr tablet [id number] No Take 1 tablet (10 mg) by mouth 2 times a day. Historical Provider, MD Taking Active   hydroCHLOROthiazide (Microzide) 12.5 mg capsule [id number] No Take by mouth. Historical Provider, MD Taking Active   HYDROcodone-acetaminophen (Norco)  mg tablet [id number]  Take 1 tablet by mouth 4 times a day as needed for severe pain (7 - 10) for up to 28 days. Do not fill before 2025. SCOTT Agrawal  Active   HYDROcodone-acetaminophen (Norco)  mg tablet [id number]  Take 1 tablet by mouth 4 times a day as needed for severe pain (7 - 10) for up to 28 days. Do not fill before 2025. SCOTT Agrawal   25 2359   HYDROcodone-acetaminophen (Norco)  mg tablet [id number]  Take 1 tablet by mouth 4 times a day as needed for severe pain (7 - 10) for up to 28 days. Do not fill before 2024. SCOTT Agrawal   25 2359   levothyroxine (Synthroid, Levoxyl) 50 mcg tablet [id number] No Take 1 tablet (50 mcg) by mouth once daily. Historical Provider, MD Taking Active   lidocaine-prilocaine (Emla) 2.5-2.5 % cream [id number]  Apply to area of treatment 1 hour prior to procedure SCOTT Agrawal  Active   metFORMIN (Glucophage) 500 mg tablet [id number] No Take 1 tablet (500 mg) by mouth once daily. Historical Provider, MD Taking Active   naloxone (Narcan) 4 mg/0.1 mL nasal spray [id number]  Administer 1 spray (4 mg) into affected nostril(s) if needed for opioid reversal or respiratory depression. SCOTT Agrawal  Active   risedronate (Actonel) 150 mg tablet [id number] No Take 1 tablet (150 mg) by mouth every 30 (thirty) days.  take with 8 oz of water, first thing in AM, do not take any other meds or food for 1/2 hr. Stay upright Historical Provider, MD Taking Active    semaglutide (Rybelsus) 7 mg tablet [id number] No Take by mouth. Historical Provider, MD Taking Active                    Allergies   Allergen Reactions    Heparin Unknown    Versed [Midazolam] Confusion       Social History     Socioeconomic History    Marital status:      Spouse name: Not on file    Number of children: Not on file    Years of education: Not on file    Highest education level: Not on file   Occupational History    Not on file   Tobacco Use    Smoking status: Former     Current packs/day: 0.00     Types: Cigarettes     Start date:      Quit date: 2017     Years since quittin.1    Smokeless tobacco: Never   Substance and Sexual Activity    Alcohol use: Not on file    Drug use: Not on file    Sexual activity: Not on file   Other Topics Concern    Not on file   Social History Narrative    Not on file     Social Drivers of Health     Financial Resource Strain: Not on file   Food Insecurity: No Food Insecurity (2025)    Received from [facility]    Hunger Vital Sign     Worried About Running Out of Food in the Last Year: Never true     Ran Out of Food in the Last Year: Never true   Transportation Needs: No Transportation Needs (2025)    Received from [facility]    PRAPARE - Transportation     Lack of Transportation (Medical): No     Lack of Transportation (Non-Medical): No   Physical Activity: Not on file   Stress: Not on file   Social Connections: Not on file   Intimate Partner Violence: Not on file   Housing Stability: Low Risk  (2025)    Received from [facility]    Housing Stability Vital Sign     Unable to Pay for Housing in the Last Year: No     Number of Times Moved in the Last Year: 0     Homeless in the Last Year: No       Past Surgical History:   Procedure Laterality Date    BREAST SURGERY  2018    Breast Surgery Reduction Procedure    HYSTERECTOMY  2018    Hysterectomy    KNEE SURGERY   04/11/2018    Knee Surgery    NECK SURGERY  04/11/2018    Neck Surgery    STOMACH SURGERY  04/11/2018    Gastric Surgery        Review of Systems   GENERAL: Negative for malaise, significant weight loss, fever  MUSCULOSKELETAL: see HPI  NEURO:  Negative     Physical Examination  Constitutional: Appears well-developed and well-nourished.  Head: Normocephalic and atraumatic.  Eyes: EOMI grossly  Cardiovascular: Intact distal pulses.   Respiratory: Effort normal. No respiratory distress.  Neurologic: Alert and oriented to person, place, and time.  Skin: Skin is warm and dry.  Hematologic / Lymphatic: No lymphedema, lymphangitis.  Psychiatric: normal mood and affect. Behavior is normal.   Musculoskeletal:  Right wrist: No tenderness to palpation.  0 cm DPC.  EPL/FPL and intrinsic function intact.  2+ radial pulse.    Left wrist: Tenderness about distal radius metaphyseal region and anatomic snuffbox as well as TFCC.  Range of motion not assessed.  Sensation grossly intact distally.  2+ radial pulse.  EPL/FPL intact.    Radiographs: Right wrist radiographs ordered and available for my review/interpretation demonstrate healing distal radius fracture with well-maintained alignment.  Left wrist radiograph report from February 20, 2022 states no fracture/dislocation.     Assessment:  Patient with well-healing right distal radius fracture undergoing nonoperative treatment.  Also with new left wrist contusion versus occult fracture.     Plan:  For her right wrist, recommend discontinuing brace and progressing activities and weightbearing as tolerated.    For her left wrist, she will continue with wrist brace and may be removed for hygiene.  Follow-up in 2 to 3 weeks with new left wrist radiographs, including scaphoid view.  Likely progress activities after that time.

## 2025-03-05 ENCOUNTER — APPOINTMENT (OUTPATIENT)
Dept: PAIN MEDICINE | Facility: CLINIC | Age: 75
End: 2025-03-05
Payer: COMMERCIAL

## 2025-03-17 ENCOUNTER — APPOINTMENT (OUTPATIENT)
Dept: PAIN MEDICINE | Facility: CLINIC | Age: 75
End: 2025-03-17
Payer: COMMERCIAL
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
## 2025-03-31 ENCOUNTER — APPOINTMENT (OUTPATIENT)
Dept: ORTHOPEDIC SURGERY | Facility: CLINIC | Age: 75
End: 2025-03-31
Payer: COMMERCIAL

## 2025-03-31 ENCOUNTER — HOSPITAL ENCOUNTER (OUTPATIENT)
Dept: RADIOLOGY | Facility: HOSPITAL | Age: 75
Discharge: HOME | End: 2025-03-31
Payer: MEDICARE

## 2025-03-31 DIAGNOSIS — S60.212A CONTUSION OF LEFT WRIST, INITIAL ENCOUNTER: ICD-10-CM

## 2025-03-31 DIAGNOSIS — S62.022A CLOSED DISPLACED FRACTURE OF MIDDLE THIRD OF SCAPHOID BONE OF LEFT WRIST, INITIAL ENCOUNTER: Primary | ICD-10-CM

## 2025-03-31 PROCEDURE — 73110 X-RAY EXAM OF WRIST: CPT | Mod: LEFT SIDE | Performed by: RADIOLOGY

## 2025-03-31 PROCEDURE — 73110 X-RAY EXAM OF WRIST: CPT | Mod: LT

## 2025-03-31 ASSESSMENT — PAIN SCALES - GENERAL: PAINLEVEL_OUTOF10: 7

## 2025-03-31 ASSESSMENT — PAIN - FUNCTIONAL ASSESSMENT: PAIN_FUNCTIONAL_ASSESSMENT: 0-10

## 2025-04-01 NOTE — PROGRESS NOTES
History of Present Illness:  Chief Complaint   Patient presents with    Right Wrist - Follow-up     well-healing right distal radius fracture     Left Wrist - Follow-up     left wrist contusion versus occult fracture     Patient was last seen 4 weeks ago for evaluation of healing right wrist fracture as well as newer left wrist injury.  The radiograph report of her left wrist was available, but images were not at her last appointment.  It was recommended that she continue with bracing of her left wrist, removing for hygiene, at her last appointment.  She reports that therapists that have been coming to her house have had her remove her brace and have had her weightbearing as well as doing range of motion exercises to her left wrist which has caused increasing pain/discomfort.  Right wrist has been feeling better and better.  She has continued to have some numbness and tingling primarily into her small and ring fingers.      Past Medical History:   Diagnosis Date    Carpal tunnel syndrome, bilateral upper limbs 04/11/2018    Bilateral carpal tunnel syndrome    Personal history of other diseases of the nervous system and sense organs 12/03/2018    History of peripheral neuropathy    Radiculopathy, cervical region 04/11/2018    Cervical radiculopathy at C5    Radiculopathy, cervical region 04/11/2018    Cervical radiculopathy at C6    Radiculopathy, cervical region 04/11/2018    Cervical radiculopathy at C7       Medication Documentation Review Audit       Reviewed by Racheal Irene CMA (Medical Assistant) on 03/31/25 at 1039      Medication Order Taking? Sig Documenting Provider Last Dose Status   amLODIPine (Norvasc) 2.5 mg tablet [id number] No Take 1 tablet (2.5 mg) by mouth once daily. Historical Provider, MD Taking Active   amLODIPine (Norvasc) 5 mg tablet [id number] No Take by mouth. Historical Provider, MD Taking Active   atenolol (Tenormin) 25 mg tablet [id number] No Take 1 tablet (25 mg) by mouth once daily.  Historical Provider, MD Taking Active   cholecalciferol (Vitamin D-3) 50 MCG (2000) tablet [id number] No Take 1 tablet (2,000 Units) by mouth once daily. Historical Provider, MD Taking Active   citalopram (CeleXA) 10 mg tablet [id number] No Take 1 tablet (10 mg) by mouth once daily. Historical Provider, MD Taking Active   gabapentin (Neurontin) 600 mg tablet [id number]  Take 1 tablet (600 mg) by mouth 3 times a day. Do not fill before 2024. SCOTT Agrawal  Active   glipiZIDE XL (Glucotrol XL) 10 mg 24 hr tablet [id number] No Take 1 tablet (10 mg) by mouth 2 times a day. Historical Provider, MD Taking Active   hydroCHLOROthiazide (Microzide) 12.5 mg capsule [id number] No Take by mouth. Historical Provider, MD Taking Active   HYDROcodone-acetaminophen (Norco)  mg tablet [id number]  Take 1 tablet by mouth 4 times a day as needed for severe pain (7 - 10) for up to 28 days. Do not fill before 2025. SCOTT Agrawal   25 235   HYDROcodone-acetaminophen (Norco)  mg tablet [id number]  Take 1 tablet by mouth 4 times a day as needed for severe pain (7 - 10) for up to 28 days. Do not fill before 2025. SCOTT Agrawal   25 2359   HYDROcodone-acetaminophen (Norco)  mg tablet [id number]  Take 1 tablet by mouth 4 times a day as needed for severe pain (7 - 10) for up to 28 days. Do not fill before 2024. SCOTT Agrawal   25 2359   levothyroxine (Synthroid, Levoxyl) 50 mcg tablet [id number] No Take 1 tablet (50 mcg) by mouth once daily. Historical Provider, MD Taking Active   lidocaine-prilocaine (Emla) 2.5-2.5 % cream [id number]  Apply to area of treatment 1 hour prior to procedure SCOTT Agrawal  Active   metFORMIN (Glucophage) 500 mg tablet [id number] No Take 1 tablet (500 mg) by mouth once daily. Historical Provider, MD Taking Active   naloxone (Narcan) 4 mg/0.1 mL nasal spray  [id number]  Administer 1 spray (4 mg) into affected nostril(s) if needed for opioid reversal or respiratory depression. LEANDRO Agrawal-CNP  Active   risedronate (Actonel) 150 mg tablet [id number] No Take 1 tablet (150 mg) by mouth every 30 (thirty) days.  take with 8 oz of water, first thing in AM, do not take any other meds or food for 1/2 hr. Stay upright Historical Provider, MD Taking Active   semaglutide (Rybelsus) 7 mg tablet [id number] No Take by mouth. Historical Provider, MD Taking Active                    Allergies   Allergen Reactions    Heparin Unknown    Versed [Midazolam] Confusion       Social History     Socioeconomic History    Marital status:      Spouse name: Not on file    Number of children: Not on file    Years of education: Not on file    Highest education level: Not on file   Occupational History    Not on file   Tobacco Use    Smoking status: Former     Current packs/day: 0.00     Types: Cigarettes     Start date:      Quit date:      Years since quittin.2    Smokeless tobacco: Never   Substance and Sexual Activity    Alcohol use: Not on file    Drug use: Not on file    Sexual activity: Not on file   Other Topics Concern    Not on file   Social History Narrative    Not on file     Social Drivers of Health     Financial Resource Strain: Not on file   Food Insecurity: No Food Insecurity (2025)    Received from [facility]    Hunger Vital Sign     Worried About Running Out of Food in the Last Year: Never true     Ran Out of Food in the Last Year: Never true   Transportation Needs: No Transportation Needs (2025)    Received from [facility]    PRAPARE - Transportation     Lack of Transportation (Medical): No     Lack of Transportation (Non-Medical): No   Physical Activity: Not on file   Stress: Not on file   Social Connections: Not on file   Intimate Partner Violence: Not on file   Housing Stability: Low Risk   (2/20/2025)    Received from [facility]    Housing Stability Vital Sign     Unable to Pay for Housing in the Last Year: No     Number of Times Moved in the Last Year: 0     Homeless in the Last Year: No       Past Surgical History:   Procedure Laterality Date    BREAST SURGERY  04/11/2018    Breast Surgery Reduction Procedure    HYSTERECTOMY  04/11/2018    Hysterectomy    KNEE SURGERY  04/11/2018    Knee Surgery    NECK SURGERY  04/11/2018    Neck Surgery    STOMACH SURGERY  04/11/2018    Gastric Surgery        Review of Systems   GENERAL: Negative for malaise, significant weight loss, fever  MUSCULOSKELETAL: see HPI  NEURO: See HPI     Physical Examination  Constitutional: Appears well-developed and well-nourished.  Head: Normocephalic and atraumatic.  Eyes: EOMI grossly  Cardiovascular: Intact distal pulses.   Respiratory: Effort normal. No respiratory distress.  Neurologic: Alert and oriented to person, place, and time.  Skin: Skin is warm and dry.  Hematologic / Lymphatic: No lymphedema, lymphangitis.  Psychiatric: normal mood and affect. Behavior is normal.   Musculoskeletal:  Right wrist: No tenderness to palpation.  0 cm DPC.  EPL/FPL and intrinsic function intact.  2+ radial pulse.    Left wrist: Persistence tenderness about distal radius metaphyseal region and anatomic snuffbox.  Also with tenderness about distal pole of scaphoid.  No tenderness in region of TFCC.  Range of motion not assessed.  Sensation grossly intact distally.  2+ radial pulse.  EPL/FPL intact.    Radiographs: Left wrist radiographs ordered and available for my review/interpretation demonstrate scaphoid waist fracture with relatively maintained alignment.  Thumb CMC/STT degenerative changes.     Assessment: Left wrist subacute scaphoid waist fracture.  Healing right wrist distal radius fracture.  Ulnar neuropathy.      Plan:  Reviewed new left wrist radiographs with patient and her daughter.  We discussed inherent  issues with treating scaphoid fractures including potential for lengthy healing process as well as potential for nonunion.  After thoroughly reviewing associated risks and benefits they wish to continue with nonoperative treatment.  Patient transitioned into well-padded short arm cast and we discussed recommendation for nonweightbearing status.  Okay to use platform based walker if weight is going through her forearm and not her wrist.    With regards to her right wrist, she may continue with progression of activities as tolerated.    Follow-up in 4 weeks with new left wrist radiographs, including scaphoid view, out of cast.

## 2025-04-07 ENCOUNTER — APPOINTMENT (OUTPATIENT)
Dept: PAIN MEDICINE | Facility: CLINIC | Age: 75
End: 2025-04-07
Payer: MEDICARE

## 2025-04-28 ENCOUNTER — APPOINTMENT (OUTPATIENT)
Dept: ORTHOPEDIC SURGERY | Facility: CLINIC | Age: 75
End: 2025-04-28
Payer: MEDICARE

## 2025-04-28 ENCOUNTER — APPOINTMENT (OUTPATIENT)
Dept: PAIN MEDICINE | Facility: CLINIC | Age: 75
End: 2025-04-28
Payer: MEDICARE

## 2025-04-28 ENCOUNTER — HOSPITAL ENCOUNTER (OUTPATIENT)
Dept: RADIOLOGY | Facility: HOSPITAL | Age: 75
Discharge: HOME | End: 2025-04-28
Payer: MEDICARE

## 2025-04-28 VITALS
HEART RATE: 70 BPM | SYSTOLIC BLOOD PRESSURE: 133 MMHG | DIASTOLIC BLOOD PRESSURE: 85 MMHG | RESPIRATION RATE: 16 BRPM | OXYGEN SATURATION: 94 %

## 2025-04-28 DIAGNOSIS — S62.022A CLOSED DISPLACED FRACTURE OF MIDDLE THIRD OF SCAPHOID BONE OF LEFT WRIST, INITIAL ENCOUNTER: ICD-10-CM

## 2025-04-28 DIAGNOSIS — M48.02 CERVICAL STENOSIS OF SPINE: ICD-10-CM

## 2025-04-28 DIAGNOSIS — G56.21 ULNAR NERVE COMPRESSION AT MULTIPLE LEVELS, RIGHT: ICD-10-CM

## 2025-04-28 DIAGNOSIS — M96.1 POSTLAMINECTOMY SYNDROME, CERVICAL REGION: ICD-10-CM

## 2025-04-28 DIAGNOSIS — M47.812 CERVICAL SPONDYLOSIS WITHOUT MYELOPATHY: ICD-10-CM

## 2025-04-28 DIAGNOSIS — S52.501A NONDISPLACED FRACTURE OF DISTAL END OF RIGHT RADIUS: ICD-10-CM

## 2025-04-28 DIAGNOSIS — S62.022A CLOSED DISPLACED FRACTURE OF MIDDLE THIRD OF SCAPHOID BONE OF LEFT WRIST, INITIAL ENCOUNTER: Primary | ICD-10-CM

## 2025-04-28 PROCEDURE — 99214 OFFICE O/P EST MOD 30 MIN: CPT | Performed by: ANESTHESIOLOGY

## 2025-04-28 PROCEDURE — 73110 X-RAY EXAM OF WRIST: CPT | Mod: BILATERAL PROCEDURE | Performed by: RADIOLOGY

## 2025-04-28 PROCEDURE — 1036F TOBACCO NON-USER: CPT | Performed by: ORTHOPAEDIC SURGERY

## 2025-04-28 PROCEDURE — 1125F AMNT PAIN NOTED PAIN PRSNT: CPT | Performed by: ANESTHESIOLOGY

## 2025-04-28 PROCEDURE — 3079F DIAST BP 80-89 MM HG: CPT | Performed by: ANESTHESIOLOGY

## 2025-04-28 PROCEDURE — 3075F SYST BP GE 130 - 139MM HG: CPT | Performed by: ANESTHESIOLOGY

## 2025-04-28 PROCEDURE — 1125F AMNT PAIN NOTED PAIN PRSNT: CPT | Performed by: ORTHOPAEDIC SURGERY

## 2025-04-28 PROCEDURE — G2211 COMPLEX E/M VISIT ADD ON: HCPCS | Performed by: ANESTHESIOLOGY

## 2025-04-28 PROCEDURE — 99213 OFFICE O/P EST LOW 20 MIN: CPT | Performed by: ORTHOPAEDIC SURGERY

## 2025-04-28 PROCEDURE — 73110 X-RAY EXAM OF WRIST: CPT | Mod: 50

## 2025-04-28 PROCEDURE — 1159F MED LIST DOCD IN RCRD: CPT | Performed by: ORTHOPAEDIC SURGERY

## 2025-04-28 PROCEDURE — L3924 HFO WITHOUT JOINTS PRE OTS: HCPCS | Performed by: ORTHOPAEDIC SURGERY

## 2025-04-28 RX ORDER — HYDROCODONE BITARTRATE AND ACETAMINOPHEN 10; 325 MG/1; MG/1
1 TABLET ORAL 4 TIMES DAILY PRN
Qty: 112 TABLET | Refills: 0 | Status: SHIPPED | OUTPATIENT
Start: 2025-06-23 | End: 2025-07-21

## 2025-04-28 RX ORDER — HYDROCODONE BITARTRATE AND ACETAMINOPHEN 10; 325 MG/1; MG/1
1 TABLET ORAL 4 TIMES DAILY PRN
Qty: 112 TABLET | Refills: 0 | Status: SHIPPED | OUTPATIENT
Start: 2025-04-28 | End: 2025-05-26

## 2025-04-28 RX ORDER — LIDOCAINE AND PRILOCAINE 25; 25 MG/G; MG/G
CREAM TOPICAL
Qty: 30 G | Refills: 2 | Status: SHIPPED | OUTPATIENT
Start: 2025-04-28

## 2025-04-28 RX ORDER — GABAPENTIN 600 MG/1
600 TABLET ORAL 3 TIMES DAILY
Qty: 300 TABLET | Refills: 2 | Status: SHIPPED | OUTPATIENT
Start: 2025-04-28

## 2025-04-28 RX ORDER — DULOXETIN HYDROCHLORIDE 20 MG/1
20 CAPSULE, DELAYED RELEASE ORAL DAILY
Qty: 30 CAPSULE | Refills: 11 | Status: SHIPPED | OUTPATIENT
Start: 2025-04-28

## 2025-04-28 RX ORDER — HYDROCODONE BITARTRATE AND ACETAMINOPHEN 10; 325 MG/1; MG/1
1 TABLET ORAL 4 TIMES DAILY PRN
Qty: 112 TABLET | Refills: 0 | Status: SHIPPED | OUTPATIENT
Start: 2025-05-26 | End: 2025-06-23

## 2025-04-28 ASSESSMENT — PAIN SCALES - GENERAL
PAINLEVEL_OUTOF10: 7
PAINLEVEL_OUTOF10: 7

## 2025-04-28 ASSESSMENT — ENCOUNTER SYMPTOMS
DEPRESSION: 1
LOSS OF SENSATION IN FEET: 1
OCCASIONAL FEELINGS OF UNSTEADINESS: 1

## 2025-04-28 ASSESSMENT — PAIN - FUNCTIONAL ASSESSMENT: PAIN_FUNCTIONAL_ASSESSMENT: 0-10

## 2025-04-28 ASSESSMENT — PAIN DESCRIPTION - DESCRIPTORS: DESCRIPTORS: SHARP;NAGGING

## 2025-04-28 NOTE — PROGRESS NOTES
History Of Present Illness  Marylou Blas is a 74 y.o. female presenting with cervical postlaminectomy syndrome.  Patient had C4-C5 anterior decompression fusion in 2013.    Patient recently had fractured her right wrist secondary to the fall and fell again and fractured her left wrist.  Patient feels that her balance has been getting steadily worse.  MRI of cervical spine done in 2022 showed mild canal stenosis at C3-C4 and C4-C5.  Patient was also diagnosed with having brain meningiomas and is followed up by neurosurgery.  Today patient has no new complaints and is present for refill of her medications.  Current medications include Norco 10/325 1 tablet 4 times daily gabapentin 600 mg 3 times daily.  Patient reports 50 to 60% relief with the current regimen.  I discussed with the patient importance of following up with her neurosurgeon regarding her imbalance and consider repeat MRI of her cervical spine and brain.  Also with evidence fact that she is high risk for falls and needs to be very careful with ambulation.  I discussed starting her on duloxetine 30 mg once daily risk and benefits were discussed and patient is agreeable with the plan.       Past Medical History  She has a past medical history of Carpal tunnel syndrome, bilateral upper limbs (04/11/2018), Personal history of other diseases of the nervous system and sense organs (12/03/2018), Radiculopathy, cervical region (04/11/2018), Radiculopathy, cervical region (04/11/2018), and Radiculopathy, cervical region (04/11/2018).    Surgical History  She has a past surgical history that includes Breast surgery (04/11/2018); Stomach surgery (04/11/2018); Hysterectomy (04/11/2018); Neck surgery (04/11/2018); and Knee surgery (04/11/2018).     Social History  She reports that she quit smoking about 8 years ago. Her smoking use included cigarettes. She started smoking about 33 years ago. She has never used smokeless tobacco. No history on file for alcohol use  and drug use.    Family History  Family History[1]     Allergies  Heparin and Versed [midazolam]    Review of systems:   13-point review of systems done and negative except as noted in HPI      Physical Exam   Vital signs: Reviewed  MSK:   No asymmetry or masses noted of the musculature.   Examination of the muscles/joints/bones show normal range of motion.  The patient is mildly tender to palpation in the cervical and lumbar paraspinal musculature as well as the medial lateral joint lines of both knees.    Neurologic:  Motor strength:   5/5 muscle strength of the upper extremities bilateral and equal.  5/5 muscle strength of the lower extremities bilaterally and equal.     Sensation:   Sensation intact to pin prick in the bilateral upper extremities.  Sensation intact to pin prick in the bilateral lower extremities.     Provocative tests: Negative Radha sign bilaterally, 2+ patellar reflexes bilaterally.    Psychiatric: Mood and affect are normal.     Last Recorded Vitals  /85 (BP Location: Right arm, Patient Position: Sitting)   Pulse 70   Resp 16   SpO2 94%     Relevant Results            Last OARRS Review: No data recorded    I have personally reviewed the OARRS report for Marylou Blas I have considered the risks of abuse, dependence, addiction and diversion  Morphine milligram equivalents is 42.6     Assessment/Plan   Diagnoses and all orders for this visit:  Cervical spondylosis without myelopathy  Cervical stenosis of spine  Postlaminectomy syndrome, cervical region  Ulnar nerve compression at multiple levels, right      Plan  Continue current pain medications  Duloxetine 30 mg once daily  Consider MRI of the brain and cervical spine.       This note was generated with the aid of dictation software, there may be typos despite my attempts at proofreading.    Luís Carrero MD       [1]   Family History  Problem Relation Name Age of Onset    Arthritis Other      Psoriasis Other

## 2025-04-29 NOTE — PROGRESS NOTES
History of Present Illness:  Chief Complaint   Patient presents with    Left Wrist - Follow-up     Left wrist subacute scaphoid waist fracture    Right Wrist - Pain       Patient undergoing nonoperative treatment for left scaphoid fracture.  She reports that her left wrist pain has been improving.  She has been in a cast.  New radiographs out of cast today.  She does feel like her right wrist is actually more symptomatic than her left.  She has had a sharp pain near the base of her thumb that is worse with gripping and pinching.    Past Medical History:   Diagnosis Date    Carpal tunnel syndrome, bilateral upper limbs 04/11/2018    Bilateral carpal tunnel syndrome    Personal history of other diseases of the nervous system and sense organs 12/03/2018    History of peripheral neuropathy    Radiculopathy, cervical region 04/11/2018    Cervical radiculopathy at C5    Radiculopathy, cervical region 04/11/2018    Cervical radiculopathy at C6    Radiculopathy, cervical region 04/11/2018    Cervical radiculopathy at C7       Medication Documentation Review Audit       Reviewed by Racheal Irene CMA (Medical Assistant) on 04/28/25 at 1101      Medication Order Taking? Sig Documenting Provider Last Dose Status   amLODIPine (Norvasc) 2.5 mg tablet [id number] No Take 1 tablet (2.5 mg) by mouth once daily. Historical Provider, MD Taking Active   amLODIPine (Norvasc) 5 mg tablet [id number] No Take by mouth. Historical Provider, MD Taking Active   atenolol (Tenormin) 25 mg tablet [id number] No Take 1 tablet (25 mg) by mouth once daily. Historical Provider, MD Taking Active   cholecalciferol (Vitamin D-3) 50 MCG (2000 UT) tablet [id number] No Take 1 tablet (2,000 Units) by mouth once daily. Historical Provider, MD Taking Active   citalopram (CeleXA) 10 mg tablet [id number] No Take 1 tablet (10 mg) by mouth once daily. Historical Provider, MD Taking Active   DULoxetine (Cymbalta) 20 mg DR capsule [id number]  Take 1 capsule (20 mg) by mouth  once daily. Do not crush or chew. Luís Carrero MD  Active     Discontinued 04/28/25 1101   gabapentin (Neurontin) 600 mg tablet [id number]  Take 1 tablet (600 mg) by mouth 3 times a day. Luís Carrero MD  Active   glipiZIDE XL (Glucotrol XL) 10 mg 24 hr tablet [id number] No Take 1 tablet (10 mg) by mouth 2 times a day. Historical Provider, MD Taking Active   hydroCHLOROthiazide (Microzide) 12.5 mg capsule [id number] No Take by mouth. Historical Provider, MD Taking Active     Discontinued 04/28/25 1101     Discontinued 04/28/25 1101     Discontinued 04/28/25 1101   HYDROcodone-acetaminophen (Norco)  mg tablet [id number]  Take 1 tablet by mouth 4 times a day as needed for severe pain (7 - 10) for up to 28 days. Luís Carrero MD  Active   HYDROcodone-acetaminophen (Norco)  mg tablet [id number]  Take 1 tablet by mouth 4 times a day as needed for severe pain (7 - 10) for up to 28 days. Do not fill before June 23, 2025. Luís Carrero MD  Active   HYDROcodone-acetaminophen (Norco)  mg tablet [id number]  Take 1 tablet by mouth 4 times a day as needed for severe pain (7 - 10) for up to 28 days. Do not fill before May 26, 2025. Luís Carrero MD  Active   levothyroxine (Synthroid, Levoxyl) 50 mcg tablet [id number] No Take 1 tablet (50 mcg) by mouth once daily. Nain Provider, MD Taking Active     Discontinued 04/28/25 1101   lidocaine-prilocaine (Emla) 2.5-2.5 % cream [id number]  Apply to area of treatment 1 hour prior to procedure Luís Carrero MD  Active   metFORMIN (Glucophage) 500 mg tablet [id number] No Take 1 tablet (500 mg) by mouth once daily. Historical Provider, MD Taking Active   naloxone (Narcan) 4 mg/0.1 mL nasal spray [id number]  Administer 1 spray (4 mg) into affected nostril(s) if needed for opioid reversal or respiratory depression. Elizabeth Lovett, APRN-CNP  Active   risedronate (Actonel) 150 mg tablet [id number] No Take 1 tablet (150 mg) by mouth every 30 (thirty) days.   take with 8 oz of water, first thing in AM, do not take any other meds or food for 1/2 hr. Stay upright Historical Provider, MD Taking Active   semaglutide (Rybelsus) 7 mg tablet [id number] No Take by mouth. Historical Provider, MD Taking Active                    Allergies   Allergen Reactions    Heparin Unknown    Versed [Midazolam] Confusion       Social History     Socioeconomic History    Marital status:      Spouse name: Not on file    Number of children: Not on file    Years of education: Not on file    Highest education level: Not on file   Occupational History    Not on file   Tobacco Use    Smoking status: Former     Current packs/day: 0.00     Types: Cigarettes     Start date:      Quit date:      Years since quittin.3    Smokeless tobacco: Never   Substance and Sexual Activity    Alcohol use: Not on file    Drug use: Not on file    Sexual activity: Not on file   Other Topics Concern    Not on file   Social History Narrative    Not on file     Social Drivers of Health     Financial Resource Strain: Not on file   Food Insecurity: No Food Insecurity (2025)    Received from [facility]    Hunger Vital Sign     Worried About Running Out of Food in the Last Year: Never true     Ran Out of Food in the Last Year: Never true   Transportation Needs: No Transportation Needs (2025)    Received from [facility]    PRAPARE - Transportation     Lack of Transportation (Medical): No     Lack of Transportation (Non-Medical): No   Physical Activity: Not on file   Stress: Not on file   Social Connections: Not on file   Intimate Partner Violence: Not on file   Housing Stability: Low Risk  (2025)    Received from [facility]    Housing Stability Vital Sign     Unable to Pay for Housing in the Last Year: No     Number of Times Moved in the Last Year: 0     Homeless in the Last Year: No       Past Surgical History:   Procedure  Laterality Date    BREAST SURGERY  04/11/2018    Breast Surgery Reduction Procedure    HYSTERECTOMY  04/11/2018    Hysterectomy    KNEE SURGERY  04/11/2018    Knee Surgery    NECK SURGERY  04/11/2018    Neck Surgery    STOMACH SURGERY  04/11/2018    Gastric Surgery        Review of Systems   GENERAL: Negative for malaise, significant weight loss, fever  MUSCULOSKELETAL: see HPI  NEURO: See HPI     Physical Examination  Constitutional: Appears well-developed and well-nourished.  Head: Normocephalic and atraumatic.  Eyes: EOMI grossly  Cardiovascular: Intact distal pulses.   Respiratory: Effort normal. No respiratory distress.  Neurologic: Alert and oriented to person, place, and time.  Skin: Skin is warm and dry.  Hematologic / Lymphatic: No lymphedema, lymphangitis.  Psychiatric: normal mood and affect. Behavior is normal.   Musculoskeletal:  Right wrist: Tenderness about thumb CMC/STT region.  Positive CMC grind.    Left wrist: Tenderness about anatomic snuffbox and along distal pole of scaphoid.  Wrist range of motion not assessed.  0 cm DPC.  2+ radial pulse.  EPL/FPL and intrinsic function intact.    Radiographs: Left wrist radiographs ordered and available for my review/interpretation-scaphoid fracture remains relatively well aligned.  No significant bony healing.  Thumb CMC/STT degenerative changes.     Assessment: Left wrist subacute scaphoid waist fracture.  Healing right wrist distal radius fracture, but now flare of pain from thumb CMC/STT arthritis.  Ulnar neuropathy.      Plan:  Left wrist radiographs reviewed with patient and daughter.  We discussed risks and benefits of various treatment options and she would like to continue with nonoperative treatment.  Transitioned into new well molded short arm cast at this time.    Right hand: We discussed likely origin of her pain and she would like to begin with use of thumb CMC Comfort Cool brace as needed.    She will follow-up in approximately 4 weeks with  new left wrist radiographs, including scaphoid view, out of cast.

## 2025-05-19 ENCOUNTER — HOSPITAL ENCOUNTER (OUTPATIENT)
Dept: RADIOLOGY | Facility: HOSPITAL | Age: 75
Discharge: HOME | End: 2025-05-19
Payer: MEDICARE

## 2025-05-19 ENCOUNTER — APPOINTMENT (OUTPATIENT)
Dept: ORTHOPEDIC SURGERY | Facility: CLINIC | Age: 75
End: 2025-05-19
Payer: MEDICARE

## 2025-05-19 DIAGNOSIS — S62.022A CLOSED DISPLACED FRACTURE OF MIDDLE THIRD OF SCAPHOID BONE OF LEFT WRIST, INITIAL ENCOUNTER: ICD-10-CM

## 2025-05-19 PROCEDURE — 73110 X-RAY EXAM OF WRIST: CPT | Mod: LT

## 2025-05-19 PROCEDURE — 73110 X-RAY EXAM OF WRIST: CPT | Mod: LEFT SIDE | Performed by: RADIOLOGY

## 2025-05-19 ASSESSMENT — PAIN - FUNCTIONAL ASSESSMENT: PAIN_FUNCTIONAL_ASSESSMENT: 0-10

## 2025-05-19 ASSESSMENT — PAIN SCALES - GENERAL: PAINLEVEL_OUTOF10: 5 - MODERATE PAIN

## 2025-05-20 NOTE — PROGRESS NOTES
History of Present Illness:  Chief Complaint   Patient presents with    Left Wrist - Follow-up     Left wrist subacute scaphoid waist fracture       Patient undergoing nonoperative treatment for left scaphoid fracture.  New radiographs out of cast today.  She does report some continued pain with pressure about her left wrist.    Some intermittent numbness and tingling into her right small and ring fingers resolves with repositioning.    Past Medical History:   Diagnosis Date    Carpal tunnel syndrome, bilateral upper limbs 04/11/2018    Bilateral carpal tunnel syndrome    Personal history of other diseases of the nervous system and sense organs 12/03/2018    History of peripheral neuropathy    Radiculopathy, cervical region 04/11/2018    Cervical radiculopathy at C5    Radiculopathy, cervical region 04/11/2018    Cervical radiculopathy at C6    Radiculopathy, cervical region 04/11/2018    Cervical radiculopathy at C7       Medication Documentation Review Audit       Reviewed by Racheal Irene CMA (Medical Assistant) on 05/19/25 at 0952      Medication Order Taking? Sig Documenting Provider Last Dose Status   amLODIPine (Norvasc) 2.5 mg tablet [id number] No Take 1 tablet (2.5 mg) by mouth once daily. Historical Provider, MD Taking Active   amLODIPine (Norvasc) 5 mg tablet [id number] No Take by mouth. Historical Provider, MD Taking Active   atenolol (Tenormin) 25 mg tablet [id number] No Take 1 tablet (25 mg) by mouth once daily. Historical Provider, MD Taking Active   cholecalciferol (Vitamin D-3) 50 MCG (2000 UT) tablet [id number] No Take 1 tablet (2,000 Units) by mouth once daily. Historical Provider, MD Taking Active   citalopram (CeleXA) 10 mg tablet [id number] No Take 1 tablet (10 mg) by mouth once daily. Historical Provider, MD Taking Active   DULoxetine (Cymbalta) 20 mg DR capsule [id number]  Take 1 capsule (20 mg) by mouth once daily. Do not crush or chew. Luís Carrero MD  Active   gabapentin (Neurontin) 600 mg  tablet [id number]  Take 1 tablet (600 mg) by mouth 3 times a day. Luís Carrero MD  Active   glipiZIDE XL (Glucotrol XL) 10 mg 24 hr tablet [id number] No Take 1 tablet (10 mg) by mouth 2 times a day. Historical Provider, MD Taking Active   hydroCHLOROthiazide (Microzide) 12.5 mg capsule [id number] No Take by mouth. Historical Provider, MD Taking Active   HYDROcodone-acetaminophen (Norco)  mg tablet [id number]  Take 1 tablet by mouth 4 times a day as needed for severe pain (7 - 10) for up to 28 days. Luís Carrero MD  Active   HYDROcodone-acetaminophen (Norco)  mg tablet [id number]  Take 1 tablet by mouth 4 times a day as needed for severe pain (7 - 10) for up to 28 days. Do not fill before June 23, 2025. Luís Carrero MD  Active   HYDROcodone-acetaminophen (Norco)  mg tablet [id number]  Take 1 tablet by mouth 4 times a day as needed for severe pain (7 - 10) for up to 28 days. Do not fill before May 26, 2025. Luís Carrero MD  Active   levothyroxine (Synthroid, Levoxyl) 50 mcg tablet [id number] No Take 1 tablet (50 mcg) by mouth once daily. Nain Provider, MD Taking Active   lidocaine-prilocaine (Emla) 2.5-2.5 % cream [id number]  Apply to area of treatment 1 hour prior to procedure Luís Carrero MD  Active   metFORMIN (Glucophage) 500 mg tablet [id number] No Take 1 tablet (500 mg) by mouth once daily. Historical Provider, MD Taking Active   naloxone (Narcan) 4 mg/0.1 mL nasal spray [id number]  Administer 1 spray (4 mg) into affected nostril(s) if needed for opioid reversal or respiratory depression. Elizabeth Lovett, APRN-CNP  Active   risedronate (Actonel) 150 mg tablet [id number] No Take 1 tablet (150 mg) by mouth every 30 (thirty) days.  take with 8 oz of water, first thing in AM, do not take any other meds or food for 1/2 hr. Stay upright Historical Provider, MD Taking Active   semaglutide (Rybelsus) 7 mg tablet [id number] No Take by mouth. Historical Provider, MD Taking Active                     Allergies   Allergen Reactions    Heparin Unknown    Versed [Midazolam] Confusion       Social History     Socioeconomic History    Marital status:      Spouse name: Not on file    Number of children: Not on file    Years of education: Not on file    Highest education level: Not on file   Occupational History    Not on file   Tobacco Use    Smoking status: Former     Current packs/day: 0.00     Types: Cigarettes     Start date:      Quit date:      Years since quittin.3    Smokeless tobacco: Never   Substance and Sexual Activity    Alcohol use: Not on file    Drug use: Not on file    Sexual activity: Not on file   Other Topics Concern    Not on file   Social History Narrative    Not on file     Social Drivers of Health     Financial Resource Strain: Not on file   Food Insecurity: No Food Insecurity (2025)    Received from [facility]    Hunger Vital Sign     Worried About Running Out of Food in the Last Year: Never true     Ran Out of Food in the Last Year: Never true   Transportation Needs: No Transportation Needs (2025)    Received from [facility]    PRAPARE - Transportation     Lack of Transportation (Medical): No     Lack of Transportation (Non-Medical): No   Physical Activity: Not on file   Stress: Not on file   Social Connections: Not on file   Intimate Partner Violence: Not on file   Housing Stability: Low Risk  (2025)    Received from [facility]    Housing Stability Vital Sign     Unable to Pay for Housing in the Last Year: No     Number of Times Moved in the Last Year: 0     Homeless in the Last Year: No       Past Surgical History:   Procedure Laterality Date    BREAST SURGERY  2018    Breast Surgery Reduction Procedure    HYSTERECTOMY  2018    Hysterectomy    KNEE SURGERY  2018    Knee Surgery    NECK SURGERY  2018    Neck Surgery    STOMACH SURGERY  2018     Gastric Surgery        Review of Systems   GENERAL: Negative for malaise, significant weight loss, fever  MUSCULOSKELETAL: see HPI  NEURO: See HPI     Physical Examination  Constitutional: Appears well-developed and well-nourished.  Head: Normocephalic and atraumatic.  Eyes: EOMI grossly  Cardiovascular: Intact distal pulses.   Respiratory: Effort normal. No respiratory distress.  Neurologic: Alert and oriented to person, place, and time.  Skin: Skin is warm and dry.  Hematologic / Lymphatic: No lymphedema, lymphangitis.  Psychiatric: normal mood and affect. Behavior is normal.   Musculoskeletal:  Left wrist: Continued tenderness about anatomic snuffbox and along distal pole of scaphoid.  Wrist range of motion not assessed.  0 cm DPC.  2+ radial pulse.  EPL/FPL and intrinsic function intact.    Right hand: 5/5 finger abduction    Radiographs: Left wrist radiographs ordered and available for my review/interpretation-scaphoid fracture remains relatively well aligned.  There does appear to be some degree of fracture consolidation.  Thumb CMC/STT degenerative changes.     Assessment: Left wrist subacute scaphoid waist fracture.  Left ulnar neuropathy.      Plan:  Left wrist radiographs reviewed with patient and daughter.  We discussed that radiographs do appear to demonstrate some degree of healing, but given patient's continued pain we discussed risks and benefits of continued casting versus bracing and obtaining additional imaging.  After thoroughly reviewing patient transitioned into new well molded cast.  Plan for CT scan for further evaluation in approximately 4 weeks and she will follow-up with me shortly after imaging completed for further review and management planning.    Recommend continued avoidance of prolonged elbow flexion and pressure on her medial elbow.

## 2025-06-16 ENCOUNTER — HOSPITAL ENCOUNTER (OUTPATIENT)
Dept: RADIOLOGY | Facility: HOSPITAL | Age: 75
Discharge: HOME | End: 2025-06-16
Payer: MEDICARE

## 2025-06-16 DIAGNOSIS — S62.022A CLOSED DISPLACED FRACTURE OF MIDDLE THIRD OF SCAPHOID BONE OF LEFT WRIST, INITIAL ENCOUNTER: ICD-10-CM

## 2025-06-16 PROCEDURE — 73200 CT UPPER EXTREMITY W/O DYE: CPT | Mod: LEFT SIDE | Performed by: STUDENT IN AN ORGANIZED HEALTH CARE EDUCATION/TRAINING PROGRAM

## 2025-06-16 PROCEDURE — 73200 CT UPPER EXTREMITY W/O DYE: CPT | Mod: LT

## 2025-06-20 ENCOUNTER — HOSPITAL ENCOUNTER (OUTPATIENT)
Dept: RADIOLOGY | Facility: CLINIC | Age: 75
End: 2025-06-20
Payer: MEDICARE

## 2025-06-23 ENCOUNTER — APPOINTMENT (OUTPATIENT)
Dept: ORTHOPEDIC SURGERY | Facility: CLINIC | Age: 75
End: 2025-06-23
Payer: MEDICARE

## 2025-06-23 DIAGNOSIS — S62.002K CLOSED DISPLACED FRACTURE OF SCAPHOID OF LEFT WRIST WITH NONUNION, UNSPECIFIED PORTION OF SCAPHOID, SUBSEQUENT ENCOUNTER: Primary | ICD-10-CM

## 2025-06-23 PROCEDURE — L3809 WHFO W/O JOINTS PRE OTS: HCPCS | Performed by: ORTHOPAEDIC SURGERY

## 2025-06-23 PROCEDURE — 1036F TOBACCO NON-USER: CPT | Performed by: ORTHOPAEDIC SURGERY

## 2025-06-23 PROCEDURE — 1159F MED LIST DOCD IN RCRD: CPT | Performed by: ORTHOPAEDIC SURGERY

## 2025-06-23 PROCEDURE — 99214 OFFICE O/P EST MOD 30 MIN: CPT | Performed by: ORTHOPAEDIC SURGERY

## 2025-06-23 ASSESSMENT — PAIN - FUNCTIONAL ASSESSMENT: PAIN_FUNCTIONAL_ASSESSMENT: 0-10

## 2025-06-23 ASSESSMENT — PAIN SCALES - GENERAL: PAINLEVEL_OUTOF10: 10 - WORST POSSIBLE PAIN

## 2025-06-24 PROBLEM — S62.002K: Status: ACTIVE | Noted: 2025-06-24

## 2025-06-24 NOTE — PROGRESS NOTES
History of Present Illness:  Chief Complaint   Patient presents with    Left Wrist - Follow-up     Left wrist subacute scaphoid waist fracture     Patient continues to undergo nonoperative treatment for left scaphoid fracture.  CT completed about 1 week ago.  She does reports continued aching pains that are sometimes sharp into her left wrist.    Continues to have intermittent numbness and tingling into small and ring fingers.  Resolves with repositioning.    Past Medical History:   Diagnosis Date    Carpal tunnel syndrome, bilateral upper limbs 04/11/2018    Bilateral carpal tunnel syndrome    Personal history of other diseases of the nervous system and sense organs 12/03/2018    History of peripheral neuropathy    Radiculopathy, cervical region 04/11/2018    Cervical radiculopathy at C5    Radiculopathy, cervical region 04/11/2018    Cervical radiculopathy at C6    Radiculopathy, cervical region 04/11/2018    Cervical radiculopathy at C7       Medication Documentation Review Audit       Reviewed by Josué Santiago MA (Medical Assistant) on 06/23/25 at 0917      Medication Order Taking? Sig Documenting Provider Last Dose Status   amLODIPine (Norvasc) 2.5 mg tablet [id number] No Take 1 tablet (2.5 mg) by mouth once daily. Historical Provider, MD Taking Active   amLODIPine (Norvasc) 5 mg tablet [id number] No Take by mouth. Historical Provider, MD Taking Active   atenolol (Tenormin) 25 mg tablet [id number] No Take 1 tablet (25 mg) by mouth once daily. Historical Provider, MD Taking Active   cholecalciferol (Vitamin D-3) 50 MCG (2000 UT) tablet [id number] No Take 1 tablet (2,000 Units) by mouth once daily. Historical Provider, MD Taking Active   citalopram (CeleXA) 10 mg tablet [id number] No Take 1 tablet (10 mg) by mouth once daily. Historical Provider, MD Taking Active   DULoxetine (Cymbalta) 20 mg DR capsule [id number]  Take 1 capsule (20 mg) by mouth once daily. Do not crush or chew. Luís Carrero MD  Active    gabapentin (Neurontin) 600 mg tablet [id number]  Take 1 tablet (600 mg) by mouth 3 times a day. Luís Carrero MD  Active   glipiZIDE XL (Glucotrol XL) 10 mg 24 hr tablet [id number] No Take 1 tablet (10 mg) by mouth 2 times a day. Historical Provider, MD Taking Active   hydroCHLOROthiazide (Microzide) 12.5 mg capsule [id number] No Take by mouth. Historical Provider, MD Taking Active   HYDROcodone-acetaminophen (Norco)  mg tablet [id number]  Take 1 tablet by mouth 4 times a day as needed for severe pain (7 - 10) for up to 28 days. Luís Carrero MD   25 2359   HYDROcodone-acetaminophen (Norco)  mg tablet [id number]  Take 1 tablet by mouth 4 times a day as needed for severe pain (7 - 10) for up to 28 days. Do not fill before 2025. Luís Carrero MD  Active   HYDROcodone-acetaminophen (Norco)  mg tablet [id number]  Take 1 tablet by mouth 4 times a day as needed for severe pain (7 - 10) for up to 28 days. Do not fill before May 26, 2025. Luís Carrero MD  Active   levothyroxine (Synthroid, Levoxyl) 50 mcg tablet [id number] No Take 1 tablet (50 mcg) by mouth once daily. Historical Provider, MD Taking Active   lidocaine-prilocaine (Emla) 2.5-2.5 % cream [id number]  Apply to area of treatment 1 hour prior to procedure Luís Carrero MD  Active   metFORMIN (Glucophage) 500 mg tablet [id number] No Take 1 tablet (500 mg) by mouth once daily. Historical Provider, MD Taking Active   naloxone (Narcan) 4 mg/0.1 mL nasal spray [id number]  Administer 1 spray (4 mg) into affected nostril(s) if needed for opioid reversal or respiratory depression. Elizabeth Lovett, APRN-CNP  Active   risedronate (Actonel) 150 mg tablet [id number] No Take 1 tablet (150 mg) by mouth every 30 (thirty) days.  take with 8 oz of water, first thing in AM, do not take any other meds or food for 1/2 hr. Stay upright Historical Provider, MD Taking Active   semaglutide (Rybelsus) 7 mg tablet [id number] No Take by  mouth. Historical Provider, MD Taking Active                    Allergies   Allergen Reactions    Heparin Unknown    Versed [Midazolam] Confusion       Social History     Socioeconomic History    Marital status:      Spouse name: Not on file    Number of children: Not on file    Years of education: Not on file    Highest education level: Not on file   Occupational History    Not on file   Tobacco Use    Smoking status: Former     Current packs/day: 0.00     Types: Cigarettes     Start date:      Quit date: 2017     Years since quittin.4    Smokeless tobacco: Never   Substance and Sexual Activity    Alcohol use: Not on file    Drug use: Not on file    Sexual activity: Not on file   Other Topics Concern    Not on file   Social History Narrative    Not on file     Social Drivers of Health     Financial Resource Strain: Not on file   Food Insecurity: No Food Insecurity (2025)    Received from [facility]    Hunger Vital Sign     Worried About Running Out of Food in the Last Year: Never true     Ran Out of Food in the Last Year: Never true   Transportation Needs: No Transportation Needs (2025)    Received from [facility]    PRAPARE - Transportation     Lack of Transportation (Medical): No     Lack of Transportation (Non-Medical): No   Physical Activity: Not on file   Stress: Not on file   Social Connections: Not on file   Intimate Partner Violence: Not on file   Housing Stability: Low Risk  (2025)    Received from [facility]    Housing Stability Vital Sign     Unable to Pay for Housing in the Last Year: No     Number of Times Moved in the Last Year: 0     Homeless in the Last Year: No       Past Surgical History:   Procedure Laterality Date    BREAST SURGERY  2018    Breast Surgery Reduction Procedure    HYSTERECTOMY  2018    Hysterectomy    KNEE SURGERY  2018    Knee Surgery    NECK SURGERY  2018     Neck Surgery    STOMACH SURGERY  04/11/2018    Gastric Surgery        Review of Systems   GENERAL: Negative for malaise, significant weight loss, fever  MUSCULOSKELETAL: see HPI  NEURO: See HPI     Physical Examination  Constitutional: Appears well-developed and well-nourished.  Head: Normocephalic and atraumatic.  Eyes: EOMI grossly  Cardiovascular: Intact distal pulses.   Respiratory: Effort normal. No respiratory distress.  Neurologic: Alert and oriented to person, place, and time.  Skin: Skin is warm and dry.  Hematologic / Lymphatic: No lymphedema, lymphangitis.  Psychiatric: normal mood and affect. Behavior is normal.   Musculoskeletal:  Left wrist: Mild tenderness about anatomic snuffbox and along distal pole of scaphoid.  Wrist range of motion not assessed.  0 cm DPC.  2+ radial pulse.  EPL/FPL and intrinsic function intact.    Right hand: 5/5 finger abduction    Left wrist CT ordered and available for my review demonstrates persistent fracture line, but there does seem to be some evidence of bridging callus.        Assessment: Left wrist scaphoid waist fracture nonunion.  Ulnar neuropathy      Plan:  Reviewed CT findings with patient and her daughter.  Given persistent fracture line and patient's desire to hopefully avoid surgery a bone stimulator has been ordered.  She will begin using this daily, as instructed.    Transition to thumb spica brace and she will remain nonweightbearing.  Follow-up in 6 weeks with new left wrist x-ray, including scaphoid view.

## 2025-06-30 ENCOUNTER — APPOINTMENT (OUTPATIENT)
Dept: ORTHOPEDIC SURGERY | Facility: CLINIC | Age: 75
End: 2025-06-30
Payer: MEDICARE

## 2025-07-07 ENCOUNTER — APPOINTMENT (OUTPATIENT)
Dept: PAIN MEDICINE | Facility: CLINIC | Age: 75
End: 2025-07-07
Payer: MEDICARE

## 2025-07-21 ENCOUNTER — HOSPITAL ENCOUNTER (OUTPATIENT)
Dept: RADIOLOGY | Facility: HOSPITAL | Age: 75
Discharge: HOME | End: 2025-07-21
Payer: MEDICARE

## 2025-07-21 ENCOUNTER — APPOINTMENT (OUTPATIENT)
Dept: ORTHOPEDIC SURGERY | Facility: CLINIC | Age: 75
End: 2025-07-21
Payer: MEDICARE

## 2025-07-21 DIAGNOSIS — S62.022A CLOSED DISPLACED FRACTURE OF MIDDLE THIRD OF SCAPHOID BONE OF LEFT WRIST, INITIAL ENCOUNTER: ICD-10-CM

## 2025-07-21 PROCEDURE — 1125F AMNT PAIN NOTED PAIN PRSNT: CPT | Performed by: ORTHOPAEDIC SURGERY

## 2025-07-21 PROCEDURE — L3908 WHO COCK-UP NONMOLDE PRE OTS: HCPCS | Performed by: ORTHOPAEDIC SURGERY

## 2025-07-21 PROCEDURE — 1159F MED LIST DOCD IN RCRD: CPT | Performed by: ORTHOPAEDIC SURGERY

## 2025-07-21 PROCEDURE — 73110 X-RAY EXAM OF WRIST: CPT | Mod: LT

## 2025-07-21 PROCEDURE — 73110 X-RAY EXAM OF WRIST: CPT | Mod: LEFT SIDE | Performed by: RADIOLOGY

## 2025-07-21 PROCEDURE — 99213 OFFICE O/P EST LOW 20 MIN: CPT | Performed by: ORTHOPAEDIC SURGERY

## 2025-07-21 ASSESSMENT — PAIN SCALES - GENERAL: PAINLEVEL_OUTOF10: 8

## 2025-07-21 ASSESSMENT — PAIN - FUNCTIONAL ASSESSMENT: PAIN_FUNCTIONAL_ASSESSMENT: 0-10

## 2025-07-22 NOTE — PROGRESS NOTES
History of Present Illness:  Chief Complaint   Patient presents with    Left Wrist - Follow-up     Left wrist subacute scaphoid waist fracture     Continued aching pains in her left wrist.  She reports compliance with thumb spica brace use.  She has not been able to obtain bone stimulator secondary to cost issues.    Past Medical History:   Diagnosis Date    Ankle sprain 2025    Arthritis 2011    Bursitis of shoulder 2010    Carpal tunnel syndrome, bilateral upper limbs 04/11/2018    Bilateral carpal tunnel syndrome    Cervical disc disorder 2010    Diabetes mellitus (Multi) 2018    Fracture of ankle 1960    Fracture, foot 2024    Hypertension 2005?    Low back sprain 1990    Low back strain 1990    Lumbosacral disc disease 2007    Neuroma of foot 1985    Personal history of other diseases of the nervous system and sense organs 12/03/2018    History of peripheral neuropathy    Radiculopathy, cervical region 04/11/2018    Cervical radiculopathy at C5    Radiculopathy, cervical region 04/11/2018    Cervical radiculopathy at C6    Radiculopathy, cervical region 04/11/2018    Cervical radiculopathy at C7    Radius and ulna distal fracture 2025    Spinal stenosis 1989    Tear of meniscus of knee 2007    Tennis elbow 1989    Thoracic disc disease 2013    Thoracic spine fracture (Multi) 2022    Wrist sprain 2025       Medication Documentation Review Audit       Reviewed by Racheal Khalil CMA (Medical Assistant) on 07/21/25 at 0911      Medication Order Taking? Sig Documenting Provider Last Dose Status   amLODIPine (Norvasc) 2.5 mg tablet [id number] No Take 1 tablet (2.5 mg) by mouth once daily. Historical Provider, MD Taking Active   amLODIPine (Norvasc) 5 mg tablet [id number] No Take by mouth. Historical Provider, MD Taking Active   atenolol (Tenormin) 25 mg tablet [id number] No Take 1 tablet (25 mg) by mouth once daily. Historical Provider, MD Taking Active   cholecalciferol (Vitamin D-3) 50 MCG (2000 UT) tablet [id number] No  Take 1 tablet (2,000 Units) by mouth once daily. Nain Provider, MD Taking Active   citalopram (CeleXA) 10 mg tablet [id number] No Take 1 tablet (10 mg) by mouth once daily. Nain Provider, MD Taking Active   DULoxetine (Cymbalta) 20 mg DR capsule [id number]  Take 1 capsule (20 mg) by mouth once daily. Do not crush or chew. Luís Carrero MD  Active   gabapentin (Neurontin) 600 mg tablet [id number]  Take 1 tablet (600 mg) by mouth 3 times a day. Luís Carrero MD  Active   glipiZIDE XL (Glucotrol XL) 10 mg 24 hr tablet [id number] No Take 1 tablet (10 mg) by mouth 2 times a day. Nain Provider, MD Taking Active   hydroCHLOROthiazide (Microzide) 12.5 mg capsule [id number] No Take by mouth. Nain Provider, MD Taking Active   HYDROcodone-acetaminophen (Norco)  mg tablet [id number]  Take 1 tablet by mouth 4 times a day as needed for severe pain (7 - 10) for up to 28 days. Luís Carrero MD   25 2359   HYDROcodone-acetaminophen (Norco)  mg tablet [id number]  Take 1 tablet by mouth 4 times a day as needed for severe pain (7 - 10) for up to 28 days. Do not fill before 2025. Luís Carrero MD  Active   HYDROcodone-acetaminophen (Norco)  mg tablet [id number]  Take 1 tablet by mouth 4 times a day as needed for severe pain (7 - 10) for up to 28 days. Do not fill before May 26, 2025. Luís Carrero MD   25 2359   levothyroxine (Synthroid, Levoxyl) 50 mcg tablet [id number] No Take 1 tablet (50 mcg) by mouth once daily. Nain Provider, MD Taking Active   lidocaine-prilocaine (Emla) 2.5-2.5 % cream [id number]  Apply to area of treatment 1 hour prior to procedure Luís Carrero MD  Active   metFORMIN (Glucophage) 500 mg tablet [id number] No Take 1 tablet (500 mg) by mouth once daily. Nain Provider, MD Taking Active   naloxone (Narcan) 4 mg/0.1 mL nasal spray [id number]  Administer 1 spray (4 mg) into affected nostril(s) if needed for opioid  reversal or respiratory depression. Elizabeth Lovett, APRN-CNP  Active   risedronate (Actonel) 150 mg tablet [id number] No Take 1 tablet (150 mg) by mouth every 30 (thirty) days.  take with 8 oz of water, first thing in AM, do not take any other meds or food for 1/2 hr. Stay upright Historical Provider, MD Taking Active   semaglutide (Rybelsus) 7 mg tablet [id number] No Take by mouth. Historical Provider, MD Taking Active                    Allergies   Allergen Reactions    Heparin Unknown    Versed [Midazolam] Confusion       Social History     Socioeconomic History    Marital status:      Spouse name: Not on file    Number of children: Not on file    Years of education: Not on file    Highest education level: Not on file   Occupational History    Not on file   Tobacco Use    Smoking status: Former     Current packs/day: 0.00     Average packs/day: 1 pack/day for 40.0 years (40.0 ttl pk-yrs)     Types: Cigarettes     Start date:      Quit date:      Years since quittin.5    Smokeless tobacco: Never   Substance and Sexual Activity    Alcohol use: Not Currently    Drug use: Never    Sexual activity: Not Currently     Partners: Male     Comment: N/A   Other Topics Concern    Not on file   Social History Narrative    Not on file     Social Drivers of Health     Financial Resource Strain: Not on file   Food Insecurity: No Food Insecurity (2025)    Received from [facility]    Hunger Vital Sign     Within the past 12 months, you worried that your food would run out before you got the money to buy more.: Never true     Within the past 12 months, the food you bought just didn't last and you didn't have money to get more.: Never true   Transportation Needs: No Transportation Needs (2025)    Received from [facility]    PRAPARE - Transportation     Lack of Transportation (Medical): No     Lack of Transportation (Non-Medical): No   Physical Activity: Not on  file   Stress: Not on file   Social Connections: Not on file   Intimate Partner Violence: Not on file   Housing Stability: Low Risk  (2/20/2025)    Received from [facility]    Housing Stability Vital Sign     In the last 12 months, was there a time when you were not able to pay the mortgage or rent on time?: No     In the past 12 months, how many times have you moved where you were living?: 0     At any time in the past 12 months, were you homeless or living in a shelter (including now)?: No       Past Surgical History:   Procedure Laterality Date    BREAST SURGERY  04/11/2018    Breast Surgery Reduction Procedure    HYSTERECTOMY  04/11/2018    Hysterectomy    KNEE SURGERY  04/11/2018    Knee Surgery    MENISCECTOMY  2007    NECK SURGERY  04/11/2018    Neck Surgery    SPINAL FUSION  2013    STOMACH SURGERY  04/11/2018    Gastric Surgery        Review of Systems   GENERAL: Negative for malaise, significant weight loss, fever  MUSCULOSKELETAL: see HPI  NEURO: See HPI     Physical Examination  Musculoskeletal:  Left wrist: Continued mild tenderness about anatomic snuffbox and along distal pole of scaphoid.  Wrist range of motion not assessed.  0 cm DPC.  2+ radial pulse.  EPL/FPL and intrinsic function intact.    Left wrist radiographs including scaphoid view ordered and available for my review/interpretation: Scaphoid fracture not as visible today.  Possible evidence of early healing.    Assessment: Left wrist scaphoid waist fracture nonunion.  Ulnar neuropathy      Plan:  We reviewed radiographs from today.  Somewhat encouraging for possible healing, but her continued pain is certainly concerning for persistent nonunion.  She is planning on contacting the bone stimulator company to see if there may be some way of obtaining the bone stimulator.  We did discuss potential role of surgical intervention if persistent symptoms.  Patient prefers to continue with nonoperative treatment at this time.   Follow-up in 1 month with repeat left wrist radiographs, including scaphoid view.

## 2025-08-13 DIAGNOSIS — M48.02 CERVICAL STENOSIS OF SPINE: ICD-10-CM

## 2025-08-13 DIAGNOSIS — M47.812 CERVICAL SPONDYLOSIS WITHOUT MYELOPATHY: ICD-10-CM

## 2025-08-14 ENCOUNTER — DOCUMENTATION (OUTPATIENT)
Dept: PAIN MEDICINE | Facility: CLINIC | Age: 75
End: 2025-08-14
Payer: MEDICARE

## 2025-08-18 ENCOUNTER — APPOINTMENT (OUTPATIENT)
Dept: ORTHOPEDIC SURGERY | Facility: CLINIC | Age: 75
End: 2025-08-18
Payer: MEDICARE

## 2025-08-18 ENCOUNTER — HOSPITAL ENCOUNTER (OUTPATIENT)
Dept: RADIOLOGY | Facility: HOSPITAL | Age: 75
Discharge: HOME | End: 2025-08-18
Payer: MEDICARE

## 2025-08-18 ENCOUNTER — OFFICE VISIT (OUTPATIENT)
Dept: PAIN MEDICINE | Facility: CLINIC | Age: 75
End: 2025-08-18
Payer: MEDICARE

## 2025-08-18 VITALS
SYSTOLIC BLOOD PRESSURE: 127 MMHG | OXYGEN SATURATION: 95 % | RESPIRATION RATE: 16 BRPM | DIASTOLIC BLOOD PRESSURE: 76 MMHG | HEART RATE: 73 BPM

## 2025-08-18 DIAGNOSIS — M54.50 ACUTE MIDLINE LOW BACK PAIN WITHOUT SCIATICA: Primary | ICD-10-CM

## 2025-08-18 DIAGNOSIS — Z79.891 ENCOUNTER FOR LONG-TERM USE OF OPIATE ANALGESIC: ICD-10-CM

## 2025-08-18 DIAGNOSIS — G56.21 ULNAR NERVE COMPRESSION AT MULTIPLE LEVELS, RIGHT: ICD-10-CM

## 2025-08-18 DIAGNOSIS — M47.812 CERVICAL SPONDYLOSIS WITHOUT MYELOPATHY: ICD-10-CM

## 2025-08-18 DIAGNOSIS — M48.02 CERVICAL STENOSIS OF SPINE: ICD-10-CM

## 2025-08-18 DIAGNOSIS — Z79.899 ENCOUNTER FOR LONG-TERM (CURRENT) USE OF HIGH-RISK MEDICATION: ICD-10-CM

## 2025-08-18 DIAGNOSIS — S62.022A CLOSED DISPLACED FRACTURE OF MIDDLE THIRD OF SCAPHOID BONE OF LEFT WRIST, INITIAL ENCOUNTER: ICD-10-CM

## 2025-08-18 DIAGNOSIS — M96.1 POSTLAMINECTOMY SYNDROME, CERVICAL REGION: ICD-10-CM

## 2025-08-18 PROCEDURE — 99214 OFFICE O/P EST MOD 30 MIN: CPT | Performed by: NURSE PRACTITIONER

## 2025-08-18 PROCEDURE — 99213 OFFICE O/P EST LOW 20 MIN: CPT | Performed by: ORTHOPAEDIC SURGERY

## 2025-08-18 PROCEDURE — 99212 OFFICE O/P EST SF 10 MIN: CPT | Performed by: NURSE PRACTITIONER

## 2025-08-18 PROCEDURE — 1159F MED LIST DOCD IN RCRD: CPT | Performed by: ORTHOPAEDIC SURGERY

## 2025-08-18 PROCEDURE — 1160F RVW MEDS BY RX/DR IN RCRD: CPT | Performed by: NURSE PRACTITIONER

## 2025-08-18 PROCEDURE — 73110 X-RAY EXAM OF WRIST: CPT | Mod: LEFT SIDE | Performed by: RADIOLOGY

## 2025-08-18 PROCEDURE — G2211 COMPLEX E/M VISIT ADD ON: HCPCS | Performed by: NURSE PRACTITIONER

## 2025-08-18 PROCEDURE — 1125F AMNT PAIN NOTED PAIN PRSNT: CPT | Performed by: NURSE PRACTITIONER

## 2025-08-18 PROCEDURE — 73110 X-RAY EXAM OF WRIST: CPT | Mod: LT

## 2025-08-18 PROCEDURE — 3074F SYST BP LT 130 MM HG: CPT | Performed by: NURSE PRACTITIONER

## 2025-08-18 PROCEDURE — 1159F MED LIST DOCD IN RCRD: CPT | Performed by: NURSE PRACTITIONER

## 2025-08-18 PROCEDURE — 3078F DIAST BP <80 MM HG: CPT | Performed by: NURSE PRACTITIONER

## 2025-08-18 PROCEDURE — 1125F AMNT PAIN NOTED PAIN PRSNT: CPT | Performed by: ORTHOPAEDIC SURGERY

## 2025-08-18 RX ORDER — HYDROCODONE BITARTRATE AND ACETAMINOPHEN 10; 325 MG/1; MG/1
1 TABLET ORAL 3 TIMES DAILY PRN
Qty: 84 TABLET | Refills: 0 | Status: SHIPPED | OUTPATIENT
Start: 2025-09-21 | End: 2025-10-19

## 2025-08-18 RX ORDER — HYDROCODONE BITARTRATE AND ACETAMINOPHEN 10; 325 MG/1; MG/1
1 TABLET ORAL 3 TIMES DAILY PRN
Qty: 84 TABLET | Refills: 0 | Status: SHIPPED | OUTPATIENT
Start: 2025-10-19 | End: 2025-11-16

## 2025-08-18 RX ORDER — GABAPENTIN 600 MG/1
600 TABLET ORAL 3 TIMES DAILY
Qty: 300 TABLET | Refills: 2 | Status: SHIPPED | OUTPATIENT
Start: 2025-08-18

## 2025-08-18 RX ORDER — LIDOCAINE AND PRILOCAINE 25; 25 MG/G; MG/G
CREAM TOPICAL
Qty: 30 G | Refills: 2 | Status: SHIPPED | OUTPATIENT
Start: 2025-08-18

## 2025-08-18 RX ORDER — HYDROCODONE BITARTRATE AND ACETAMINOPHEN 10; 325 MG/1; MG/1
1 TABLET ORAL 3 TIMES DAILY PRN
Qty: 84 TABLET | Refills: 0 | Status: SHIPPED | OUTPATIENT
Start: 2025-08-24 | End: 2025-09-21

## 2025-08-18 RX ORDER — NALOXONE HYDROCHLORIDE 4 MG/.1ML
1 SPRAY NASAL AS NEEDED
Qty: 2 EACH | Refills: 0 | Status: SHIPPED | OUTPATIENT
Start: 2025-08-18

## 2025-08-18 ASSESSMENT — PAIN DESCRIPTION - DESCRIPTORS: DESCRIPTORS: CRUSHING

## 2025-08-18 ASSESSMENT — ENCOUNTER SYMPTOMS
OCCASIONAL FEELINGS OF UNSTEADINESS: 1
LOSS OF SENSATION IN FEET: 1
DEPRESSION: 1

## 2025-08-18 ASSESSMENT — PAIN SCALES - GENERAL
PAINLEVEL_OUTOF10: 8
PAINLEVEL_OUTOF10: 8

## 2025-08-18 ASSESSMENT — PAIN - FUNCTIONAL ASSESSMENT: PAIN_FUNCTIONAL_ASSESSMENT: 0-10

## 2025-08-29 ENCOUNTER — TELEPHONE (OUTPATIENT)
Dept: ORTHOPEDIC SURGERY | Facility: CLINIC | Age: 75
End: 2025-08-29
Payer: MEDICARE

## 2025-09-02 ENCOUNTER — TELEPHONE (OUTPATIENT)
Dept: PAIN MEDICINE | Facility: CLINIC | Age: 75
End: 2025-09-02
Payer: MEDICARE

## 2025-09-05 ENCOUNTER — APPOINTMENT (OUTPATIENT)
Dept: RADIOLOGY | Facility: HOSPITAL | Age: 75
End: 2025-09-05
Payer: MEDICARE

## 2025-09-05 ENCOUNTER — HOSPITAL ENCOUNTER (OUTPATIENT)
Dept: RADIOLOGY | Facility: HOSPITAL | Age: 75
End: 2025-09-05
Payer: MEDICARE

## 2025-09-15 ENCOUNTER — APPOINTMENT (OUTPATIENT)
Dept: ORTHOPEDIC SURGERY | Facility: CLINIC | Age: 75
End: 2025-09-15
Payer: MEDICARE